# Patient Record
Sex: FEMALE | Race: BLACK OR AFRICAN AMERICAN | NOT HISPANIC OR LATINO | Employment: FULL TIME | URBAN - METROPOLITAN AREA
[De-identification: names, ages, dates, MRNs, and addresses within clinical notes are randomized per-mention and may not be internally consistent; named-entity substitution may affect disease eponyms.]

---

## 2019-06-04 ENCOUNTER — APPOINTMENT (EMERGENCY)
Dept: CT IMAGING | Facility: HOSPITAL | Age: 34
End: 2019-06-04

## 2019-06-04 ENCOUNTER — HOSPITAL ENCOUNTER (EMERGENCY)
Facility: HOSPITAL | Age: 34
Discharge: HOME | End: 2019-06-04
Attending: EMERGENCY MEDICINE | Admitting: EMERGENCY MEDICINE

## 2019-06-04 ENCOUNTER — APPOINTMENT (EMERGENCY)
Dept: RADIOLOGY | Facility: HOSPITAL | Age: 34
End: 2019-06-04

## 2019-06-04 VITALS
OXYGEN SATURATION: 100 % | HEART RATE: 87 BPM | DIASTOLIC BLOOD PRESSURE: 61 MMHG | SYSTOLIC BLOOD PRESSURE: 108 MMHG | RESPIRATION RATE: 18 BRPM | TEMPERATURE: 98 F | WEIGHT: 171.96 LBS

## 2019-06-04 DIAGNOSIS — M54.50 LOW BACK PAIN: ICD-10-CM

## 2019-06-04 DIAGNOSIS — R07.9 NONSPECIFIC CHEST PAIN: Primary | ICD-10-CM

## 2019-06-04 LAB
ALBUMIN SERPL BCP-MCNC: 3.6 G/DL (ref 3.5–5)
ALP SERPL-CCNC: 58 U/L (ref 46–116)
ALT SERPL W P-5'-P-CCNC: 17 U/L (ref 12–78)
ANION GAP SERPL CALCULATED.3IONS-SCNC: 11 MMOL/L (ref 4–13)
APTT PPP: 34 SECONDS (ref 26–38)
AST SERPL W P-5'-P-CCNC: 26 U/L (ref 5–45)
BACTERIA UR QL AUTO: ABNORMAL /HPF
BASOPHILS # BLD AUTO: 0.04 THOUSANDS/ΜL (ref 0–0.1)
BASOPHILS NFR BLD AUTO: 1 % (ref 0–1)
BILIRUB SERPL-MCNC: 0.4 MG/DL (ref 0.2–1)
BILIRUB UR QL STRIP: NEGATIVE
BUN SERPL-MCNC: 9 MG/DL (ref 5–25)
CALCIUM SERPL-MCNC: 8.8 MG/DL (ref 8.3–10.1)
CHLORIDE SERPL-SCNC: 107 MMOL/L (ref 100–108)
CLARITY UR: CLEAR
CO2 SERPL-SCNC: 26 MMOL/L (ref 21–32)
COLOR UR: YELLOW
CREAT SERPL-MCNC: 0.67 MG/DL (ref 0.6–1.3)
EOSINOPHIL # BLD AUTO: 0.07 THOUSAND/ΜL (ref 0–0.61)
EOSINOPHIL NFR BLD AUTO: 1 % (ref 0–6)
ERYTHROCYTE [DISTWIDTH] IN BLOOD BY AUTOMATED COUNT: 17.1 % (ref 11.6–15.1)
EXT PREG TEST URINE: NEGATIVE
GFR SERPL CREATININE-BSD FRML MDRD: 134 ML/MIN/1.73SQ M
GLUCOSE SERPL-MCNC: 87 MG/DL (ref 65–140)
GLUCOSE UR STRIP-MCNC: NEGATIVE MG/DL
HCT VFR BLD AUTO: 34.3 % (ref 34.8–46.1)
HGB BLD-MCNC: 11.1 G/DL (ref 11.5–15.4)
HGB UR QL STRIP.AUTO: ABNORMAL
IMM GRANULOCYTES # BLD AUTO: 0.02 THOUSAND/UL (ref 0–0.2)
IMM GRANULOCYTES NFR BLD AUTO: 0 % (ref 0–2)
INR PPP: 1.04 (ref 0.86–1.17)
KETONES UR STRIP-MCNC: NEGATIVE MG/DL
LEUKOCYTE ESTERASE UR QL STRIP: ABNORMAL
LYMPHOCYTES # BLD AUTO: 2.18 THOUSANDS/ΜL (ref 0.6–4.47)
LYMPHOCYTES NFR BLD AUTO: 34 % (ref 14–44)
MCH RBC QN AUTO: 25.8 PG (ref 26.8–34.3)
MCHC RBC AUTO-ENTMCNC: 32.4 G/DL (ref 31.4–37.4)
MCV RBC AUTO: 80 FL (ref 82–98)
MONOCYTES # BLD AUTO: 0.49 THOUSAND/ΜL (ref 0.17–1.22)
MONOCYTES NFR BLD AUTO: 8 % (ref 4–12)
NEUTROPHILS # BLD AUTO: 3.58 THOUSANDS/ΜL (ref 1.85–7.62)
NEUTS SEG NFR BLD AUTO: 56 % (ref 43–75)
NITRITE UR QL STRIP: NEGATIVE
NON-SQ EPI CELLS URNS QL MICRO: ABNORMAL /HPF
NRBC BLD AUTO-RTO: 0 /100 WBCS
PH UR STRIP.AUTO: 7.5 [PH]
PLATELET # BLD AUTO: 399 THOUSANDS/UL (ref 149–390)
PMV BLD AUTO: 11 FL (ref 8.9–12.7)
POTASSIUM SERPL-SCNC: 4.7 MMOL/L (ref 3.5–5.3)
PROT SERPL-MCNC: 7.3 G/DL (ref 6.4–8.2)
PROT UR STRIP-MCNC: NEGATIVE MG/DL
PROTHROMBIN TIME: 13.5 SECONDS (ref 11.8–14.2)
RBC # BLD AUTO: 4.3 MILLION/UL (ref 3.81–5.12)
RBC #/AREA URNS AUTO: ABNORMAL /HPF
SODIUM SERPL-SCNC: 144 MMOL/L (ref 136–145)
SP GR UR STRIP.AUTO: 1.01 (ref 1–1.03)
TROPONIN I SERPL-MCNC: <0.02 NG/ML
TSH SERPL DL<=0.05 MIU/L-ACNC: 3.89 UIU/ML (ref 0.36–3.74)
UROBILINOGEN UR QL STRIP.AUTO: 0.2 E.U./DL
WBC # BLD AUTO: 6.38 THOUSAND/UL (ref 4.31–10.16)
WBC #/AREA URNS AUTO: ABNORMAL /HPF

## 2019-06-04 PROCEDURE — 81001 URINALYSIS AUTO W/SCOPE: CPT | Performed by: EMERGENCY MEDICINE

## 2019-06-04 PROCEDURE — 84443 ASSAY THYROID STIM HORMONE: CPT | Performed by: EMERGENCY MEDICINE

## 2019-06-04 PROCEDURE — 96375 TX/PRO/DX INJ NEW DRUG ADDON: CPT

## 2019-06-04 PROCEDURE — 80053 COMPREHEN METABOLIC PANEL: CPT | Performed by: EMERGENCY MEDICINE

## 2019-06-04 PROCEDURE — 36415 COLL VENOUS BLD VENIPUNCTURE: CPT | Performed by: EMERGENCY MEDICINE

## 2019-06-04 PROCEDURE — 84484 ASSAY OF TROPONIN QUANT: CPT | Performed by: EMERGENCY MEDICINE

## 2019-06-04 PROCEDURE — 96361 HYDRATE IV INFUSION ADD-ON: CPT

## 2019-06-04 PROCEDURE — 96374 THER/PROPH/DIAG INJ IV PUSH: CPT

## 2019-06-04 PROCEDURE — 81025 URINE PREGNANCY TEST: CPT | Performed by: EMERGENCY MEDICINE

## 2019-06-04 PROCEDURE — 99285 EMERGENCY DEPT VISIT HI MDM: CPT | Performed by: EMERGENCY MEDICINE

## 2019-06-04 PROCEDURE — 99285 EMERGENCY DEPT VISIT HI MDM: CPT

## 2019-06-04 PROCEDURE — 85610 PROTHROMBIN TIME: CPT | Performed by: EMERGENCY MEDICINE

## 2019-06-04 PROCEDURE — 85730 THROMBOPLASTIN TIME PARTIAL: CPT | Performed by: EMERGENCY MEDICINE

## 2019-06-04 PROCEDURE — 74176 CT ABD & PELVIS W/O CONTRAST: CPT

## 2019-06-04 PROCEDURE — 85025 COMPLETE CBC W/AUTO DIFF WBC: CPT | Performed by: EMERGENCY MEDICINE

## 2019-06-04 PROCEDURE — 71046 X-RAY EXAM CHEST 2 VIEWS: CPT

## 2019-06-04 RX ORDER — NAPROXEN 500 MG/1
500 TABLET ORAL 2 TIMES DAILY WITH MEALS
Qty: 20 TABLET | Refills: 0 | Status: SHIPPED | OUTPATIENT
Start: 2019-06-04

## 2019-06-04 RX ORDER — KETOROLAC TROMETHAMINE 30 MG/ML
30 INJECTION, SOLUTION INTRAMUSCULAR; INTRAVENOUS ONCE
Status: COMPLETED | OUTPATIENT
Start: 2019-06-04 | End: 2019-06-04

## 2019-06-04 RX ORDER — CYCLOBENZAPRINE HCL 10 MG
10 TABLET ORAL 2 TIMES DAILY PRN
Qty: 20 TABLET | Refills: 0 | Status: SHIPPED | OUTPATIENT
Start: 2019-06-04

## 2019-06-04 RX ADMIN — SODIUM CHLORIDE 1000 ML: 0.9 INJECTION, SOLUTION INTRAVENOUS at 19:47

## 2019-06-04 RX ADMIN — KETOROLAC TROMETHAMINE 30 MG: 30 INJECTION, SOLUTION INTRAMUSCULAR at 19:12

## 2019-06-04 RX ADMIN — FAMOTIDINE 20 MG: 10 INJECTION, SOLUTION INTRAVENOUS at 19:12

## 2021-01-17 ENCOUNTER — APPOINTMENT (EMERGENCY)
Dept: RADIOLOGY | Facility: HOSPITAL | Age: 36
End: 2021-01-17
Payer: COMMERCIAL

## 2021-01-17 ENCOUNTER — HOSPITAL ENCOUNTER (EMERGENCY)
Facility: HOSPITAL | Age: 36
Discharge: HOME | End: 2021-01-17
Attending: EMERGENCY MEDICINE | Admitting: EMERGENCY MEDICINE
Payer: COMMERCIAL

## 2021-01-17 VITALS
RESPIRATION RATE: 20 BRPM | SYSTOLIC BLOOD PRESSURE: 141 MMHG | BODY MASS INDEX: 34.53 KG/M2 | HEIGHT: 67 IN | OXYGEN SATURATION: 100 % | HEART RATE: 74 BPM | DIASTOLIC BLOOD PRESSURE: 70 MMHG | TEMPERATURE: 98.4 F | WEIGHT: 220.02 LBS

## 2021-01-17 DIAGNOSIS — S82.892A CLOSED FRACTURE DISLOCATION OF LEFT ANKLE, INITIAL ENCOUNTER: ICD-10-CM
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
## 2021-01-17 DIAGNOSIS — S82.852A: Primary | ICD-10-CM

## 2021-01-17 PROCEDURE — 99152 MOD SED SAME PHYS/QHP 5/>YRS: CPT | Performed by: EMERGENCY MEDICINE

## 2021-01-17 PROCEDURE — 73610 X-RAY EXAM OF ANKLE: CPT

## 2021-01-17 PROCEDURE — 27818 TREATMENT OF ANKLE FRACTURE: CPT | Performed by: EMERGENCY MEDICINE

## 2021-01-17 PROCEDURE — 99284 EMERGENCY DEPT VISIT MOD MDM: CPT

## 2021-01-17 PROCEDURE — 99285 EMERGENCY DEPT VISIT HI MDM: CPT | Performed by: EMERGENCY MEDICINE

## 2021-01-17 PROCEDURE — 73600 X-RAY EXAM OF ANKLE: CPT

## 2021-01-17 PROCEDURE — 96374 THER/PROPH/DIAG INJ IV PUSH: CPT

## 2021-01-17 RX ORDER — HYDROCODONE BITARTRATE AND ACETAMINOPHEN 5; 325 MG/1; MG/1
1 TABLET ORAL EVERY 6 HOURS PRN
Qty: 12 TABLET | Refills: 0 | Status: SHIPPED | OUTPATIENT
Start: 2021-01-17 | End: 2021-01-25 | Stop reason: HOSPADM

## 2021-01-17 RX ORDER — PROPOFOL 10 MG/ML
100 INJECTION, EMULSION INTRAVENOUS ONCE
Status: COMPLETED | OUTPATIENT
Start: 2021-01-17 | End: 2021-01-17

## 2021-01-17 RX ADMIN — MORPHINE SULFATE 2 MG: 2 INJECTION, SOLUTION INTRAMUSCULAR; INTRAVENOUS at 17:30

## 2021-01-17 RX ADMIN — PROPOFOL 100 MG: 10 INJECTION, EMULSION INTRAVENOUS at 18:07

## 2021-01-17 NOTE — ED PROVIDER NOTES
History  Chief Complaint   Patient presents with    Ankle Injury     pt was cleaning out her car and fell on ice, no BT, no LOC did not hit her head     HPI    Prior to Admission Medications   Prescriptions Last Dose Informant Patient Reported? Taking? cyclobenzaprine (FLEXERIL) 10 mg tablet   No No   Sig: Take 1 tablet (10 mg total) by mouth 2 (two) times a day as needed for muscle spasms   naproxen (NAPROSYN) 500 mg tablet   No No   Sig: Take 1 tablet (500 mg total) by mouth 2 (two) times a day with meals      Facility-Administered Medications: None       History reviewed  No pertinent past medical history  Past Surgical History:   Procedure Laterality Date     SECTION         History reviewed  No pertinent family history  I have reviewed and agree with the history as documented  E-Cigarette/Vaping    E-Cigarette Use Never User      E-Cigarette/Vaping Substances     Social History     Tobacco Use    Smoking status: Never Smoker    Smokeless tobacco: Never Used   Substance Use Topics    Alcohol use: Not Currently    Drug use: Not on file       Review of Systems    Physical Exam  Physical Exam  Vitals signs and nursing note reviewed  Constitutional:       General: She is not in acute distress  Appearance: She is well-developed  HENT:      Head: Normocephalic and atraumatic  Eyes:      Conjunctiva/sclera: Conjunctivae normal       Pupils: Pupils are equal, round, and reactive to light  Neck:      Musculoskeletal: Normal range of motion  Trachea: No tracheal deviation  Cardiovascular:      Rate and Rhythm: Normal rate and regular rhythm  Pulses:           Dorsalis pedis pulses are 2+ on the left side  Pulmonary:      Effort: Pulmonary effort is normal  No respiratory distress  Musculoskeletal:      Left hip: She exhibits normal range of motion and no tenderness  Left knee: She exhibits no swelling  No tenderness found        Left ankle: She exhibits decreased range of motion, swelling and deformity  Tenderness (diffuse)  Left upper leg: She exhibits no tenderness  Left lower leg: She exhibits no tenderness  Left foot: Normal range of motion  No tenderness  Skin:     General: Skin is warm and dry  Neurological:      Mental Status: She is alert and oriented to person, place, and time  GCS: GCS eye subscore is 4  GCS verbal subscore is 5  GCS motor subscore is 6     Psychiatric:         Behavior: Behavior normal          Vital Signs  ED Triage Vitals   Temperature Pulse Respirations Blood Pressure SpO2   01/17/21 1701 01/17/21 1701 01/17/21 1701 01/17/21 1701 01/17/21 1701   98 4 °F (36 9 °C) 83 18 139/66 99 %      Temp Source Heart Rate Source Patient Position - Orthostatic VS BP Location FiO2 (%)   01/17/21 1701 01/17/21 1701 01/17/21 1701 01/17/21 1701 --   Oral Monitor Sitting Right arm       Pain Score       01/17/21 1730       Worst Possible Pain           Vitals:    01/17/21 1810 01/17/21 1810 01/17/21 1812 01/17/21 1815   BP: 152/68 152/68  133/63   Pulse:  96 80 71   Patient Position - Orthostatic VS:    Lying         Visual Acuity      ED Medications  Medications   morphine injection 2 mg (2 mg Intravenous Given 1/17/21 1730)   propofol (DIPRIVAN) 200 MG/20ML bolus injection 100 mg (100 mg Intravenous Given 1/17/21 1807)       Diagnostic Studies  Results Reviewed     None                 XR ankle 2 views LEFT   ED Interpretation by Ericka López MD (01/17 1838)   Improved alignment      XR ankle 3+ views LEFT   ED Interpretation by Ericka López MD (01/17 1728)   Fracture/dislocation                 Procedures  Pre-Procedural Sedation  Performed by: Ericka López MD  Authorized by: Ericka López MD     Consent:     Consent obtained:  Verbal    Consent given by:  Patient    Risks discussed:  Dysrhythmia, allergic reaction, inadequate sedation, nausea, respiratory compromise necessitating ventilatory assistance and intubation, prolonged sedation necessitating reversal, prolonged hypoxia resulting in organ damage and vomiting    Alternatives discussed:  Analgesia without sedation  Universal protocol:     Procedure explained and questions answered to patient or proxy's satisfaction: yes      Site/side marked: yes    Indications:     Sedation purpose:  Fracture reduction    Procedure necessitating sedation performed by:  Physician performing sedation    Intended level of sedation:  Moderate (conscious sedation)  Pre-sedation assessment:     NPO status caution: urgency dictates proceeding with non-ideal NPO status      ASA classification: class 1 - normal, healthy patient      Mouth opening:  3 or more finger widths    Thyromental distance:  3 finger widths    Mallampati score:  II - soft palate, uvula, fauces visible    Pre-sedation assessments completed and reviewed: airway patency, cardiovascular function, hydration status, mental status, nausea/vomiting, pain level, respiratory function and temperature      History of difficult intubation: no      Pre-sedation assessment completed:  1/17/2021 5:40 PM  Orthopedic injury treatment    Date/Time: 1/17/2021 6:10 PM  Performed by: Chao Zuniga MD  Authorized by: Chao Zuniga MD     Patient Location:  Bedside  Other Assisting Provider: Yes (comment) deidra Brownlee)    Verbal consent obtained?: Yes    Risks and benefits: Risks, benefits and alternatives were discussed    Consent given by:  Parent and patient  Patient states understanding of procedure being performed: Yes    Patient's understanding of procedure matches consent: Yes    Procedure consent matches procedure scheduled: Yes    Relevant documents present and verified: Yes    Radiology Images displayed and confirmed   If images not available, report reviewed: Yes    Required items: Required blood products, implants, devices and special equipment available    Patient identity confirmed:  Verbally with patient  Time out: Immediately prior to the procedure a time out was called    Injury location:  Ankle  Location details:  Left ankle  Injury type:  Fracture-dislocation  Fracture type: trimalleolar    Neurovascular status: Neurovascularly intact    Distal perfusion: normal    Neurological function: normal    Range of motion: reduced    Local anesthesia used?: No    General anesthesia used?: No    Sedation type: Moderate (conscious) sedation (See separate Procedural Sedation form)  Manipulation performed?: Yes    Skeletal traction used?: Yes    Reduction successful?: Yes    Confirmation: Reduction confirmed by x-ray    Immobilization:  Splint  Splint type:  Short leg  Supplies used:  Ortho-Glass  Neurovascular status: Neurovascularly intact    Distal perfusion: normal    Neurological function: normal    Range of motion: improved    Range of motion comment:  Passive ROM performed under sedation  Patient tolerance:  Patient tolerated the procedure well with no immediate complications   Mild anterior displacement of the tibia in relation to the talus but significantly improved alignment  Patient neurovascular intact distally after splint placement    Procedural Sedation    Date/Time: 1/17/2021 6:05 PM  Performed by: Evangelina Manley MD  Authorized by: Evangelina Manley MD     Immediate pre-procedure details:     Reassessment: Patient reassessed immediately prior to procedure      Reviewed: vital signs, relevant labs/tests and NPO status      Verified: bag valve mask available, emergency equipment available, intubation equipment available, IV patency confirmed, oxygen available and suction available    Procedure details (see MAR for exact dosages):     Sedation start time:  1/17/2021 6:05 PM    Preoxygenation:  Nasal cannula    Sedation:  Propofol    Intra-procedure monitoring:  Blood pressure monitoring, cardiac monitor, continuous capnometry, continuous pulse oximetry, frequent LOC assessments and frequent vital sign checks    Intra-procedure events: none      Sedation end time:  1/17/2021 6:20 PM    Total sedation time (minutes):  15  Post-procedure details:     Post-sedation assessment completed:  1/17/2021 6:30 PM    Attendance: Constant attendance by certified staff until patient recovered      Recovery: Patient returned to pre-procedure baseline      Post-sedation assessments completed and reviewed: airway patency, cardiovascular function, hydration status, mental status, nausea/vomiting, pain level, respiratory function and temperature      Patient is stable for discharge or admission: yes      Patient tolerance: Tolerated well, no immediate complications      Conscious Sedation Assessment      Classification Score   ASA Scale Assessment  1-Healthy patient, no disease outside surgical process filed at 01/17/2021 1751             ED Course                             SBIRT 22yo+      Most Recent Value   SBIRT (23 yo +)   In order to provide better care to our patients, we are screening all of our patients for alcohol and drug use  Would it be okay to ask you these screening questions? Yes Filed at: 01/17/2021 1706   Initial Alcohol Screen: US AUDIT-C    1  How often do you have a drink containing alcohol?  0 Filed at: 01/17/2021 1706   2  How many drinks containing alcohol do you have on a typical day you are drinking? 0 Filed at: 01/17/2021 1706   3b  FEMALE Any Age, or MALE 65+: How often do you have 4 or more drinks on one occassion? 0 Filed at: 01/17/2021 1706   Audit-C Score  0 Filed at: 01/17/2021 1706   MELODY: How many times in the past year have you    Used an illegal drug or used a prescription medication for non-medical reasons?   Never Filed at: 01/17/2021 1706                    MDM  Number of Diagnoses or Management Options  Closed fracture dislocation of left ankle, initial encounter: new and requires workup  Closed traumatic displaced trimalleolar fracture of left ankle, initial encounter: new and requires workup  Fall due to slipping on ice or snow, initial encounter: new and requires workup  Diagnosis management comments: This is a 51-year-old female who presents here today for evaluation of a left lower leg injury  She states shortly prior to arrival she slipped on ice in front of her house and fell  She has been unable to bear weight since then  She denies other injuries from the event  She denies prior injuries to her ankle  She has not had anything for pain  Pain is localized to the ankle  She was placed in a splint by EMS prior to arrival     Review of systems otherwise negative unless stated as above    She is well-appearing, in no acute distress  She has obvious deformity of the ankle with diffuse tenderness to the area  There is no tenderness throughout the rest like of the foot  She is neurovascularly intact distally  Exam is otherwise unremarkable  We will get x-rays to determine the extent of the underlying injury, however she will likely need sedation for reduction given the visible deformity  X-ray was reviewed by myself and shows a trimalleolar fracture with dislocation  Reduction was performed as above with significant improvement of alignment improved pain  I discussed with the patient findings, treatment at home, need for close follow-up with Orthopedics, and indications for return, and she expresses understanding with this plan         Amount and/or Complexity of Data Reviewed  Tests in the radiology section of CPT®: ordered and reviewed  Independent visualization of images, tracings, or specimens: yes        Disposition  Final diagnoses:   Closed traumatic displaced trimalleolar fracture of left ankle, initial encounter   Closed fracture dislocation of left ankle, initial encounter   Fall due to slipping on ice or snow, initial encounter     Time reflects when diagnosis was documented in both MDM as applicable and the Disposition within this note     Time User Action Codes Description Comment    1/17/2021  6:49 PM FierroRajNathanmike Add [J59 942G] Closed traumatic displaced trimalleolar fracture of left ankle, initial encounter     1/17/2021  6:49 PM Raphael Nathan Castrejonmike Add [Y90 113I] Closed fracture dislocation of left ankle, initial encounter     1/17/2021  6:49 PM Nathan Lim Maryellensheila Add [W00  9XXA] Fall due to slipping on ice or snow, initial encounter       ED Disposition     ED Disposition Condition Date/Time Comment    Discharge Good Sun Jan 17, 2021  6:43 PM Florence Escalera discharge to home/self care  Follow-up Information     Follow up With Specialties Details Why Contact Info Additional [address] [facility] Schedule an appointment as soon as possible for a visit in 3 days to follow up on your ankle [address] [facility] [address], [address] [place] [address], [address] [facility]          Patient's Medications   Discharge Prescriptions    HYDROCODONE-ACETAMINOPHEN (NORCO) 5-325 MG PER TABLET    Take 1 tablet by mouth every 6 (six) hours as needed (severe pain, not otherwise controlled) for up to 10 daysMax Daily Amount: 4 tablets       Start Date: 1/17/2021 End Date: 1/27/2021       Order Dose: 1 tablet       Quantity: 12 tablet    Refills: 0     No discharge procedures on file      PDMP Review     None          ED Provider  Electronically Signed by           Nakul Jennings MD  01/17/21 7855

## 2021-01-17 NOTE — DISCHARGE INSTRUCTIONS
Keep the splint on and dry  Keep your leg elevated as much as possible  Take ibuprofen (Motrin, Advil) or acetaminophen (Tylenol) as needed for pain, as per the instructions  Take the prescription pain medication as needed for continued severe pain  If you can find the pills you have left over oxycodone that you were prescribed after your  you can take those, and shred the prescription you were given today  If you are unable to find it, fill the new prescription to take as needed  Follow up closely with the orthopedist for further evaluation  Return if you have severe pain that you are unable to control at home, have significant swelling of your leg or foot, lose sensation of your toes, have discoloration of the skin, or for any other concerns

## 2021-01-19 ENCOUNTER — APPOINTMENT (OUTPATIENT)
Dept: LAB | Facility: HOSPITAL | Age: 36
End: 2021-01-19
Attending: ORTHOPAEDIC SURGERY
Payer: COMMERCIAL

## 2021-01-19 ENCOUNTER — APPOINTMENT (OUTPATIENT)
Dept: RADIOLOGY | Facility: CLINIC | Age: 36
End: 2021-01-19
Payer: COMMERCIAL

## 2021-01-19 ENCOUNTER — HOSPITAL ENCOUNTER (OUTPATIENT)
Dept: CT IMAGING | Facility: HOSPITAL | Age: 36
Discharge: HOME | End: 2021-01-19
Attending: ORTHOPAEDIC SURGERY
Payer: COMMERCIAL

## 2021-01-19 ENCOUNTER — OFFICE VISIT (OUTPATIENT)
Dept: OBGYN CLINIC | Facility: CLINIC | Age: 36
End: 2021-01-19
Payer: COMMERCIAL

## 2021-01-19 VITALS
HEIGHT: 67 IN | SYSTOLIC BLOOD PRESSURE: 122 MMHG | HEART RATE: 103 BPM | BODY MASS INDEX: 34.46 KG/M2 | DIASTOLIC BLOOD PRESSURE: 83 MMHG

## 2021-01-19 DIAGNOSIS — S82.852A CLOSED TRIMALLEOLAR FRACTURE OF LEFT ANKLE, INITIAL ENCOUNTER: ICD-10-CM

## 2021-01-19 DIAGNOSIS — S82.852A CLOSED TRIMALLEOLAR FRACTURE OF LEFT ANKLE, INITIAL ENCOUNTER: Primary | ICD-10-CM

## 2021-01-19 LAB
ATRIAL RATE: 86 BPM
BASOPHILS # BLD AUTO: 0.03 THOUSANDS/ΜL (ref 0–0.1)
BASOPHILS NFR BLD AUTO: 0 % (ref 0–1)
EOSINOPHIL # BLD AUTO: 0.04 THOUSAND/ΜL (ref 0–0.61)
EOSINOPHIL NFR BLD AUTO: 1 % (ref 0–6)
ERYTHROCYTE [DISTWIDTH] IN BLOOD BY AUTOMATED COUNT: 12.6 % (ref 11.6–15.1)
HCT VFR BLD AUTO: 41.1 % (ref 34.8–46.1)
HGB BLD-MCNC: 13.4 G/DL (ref 11.5–15.4)
IMM GRANULOCYTES # BLD AUTO: 0.01 THOUSAND/UL (ref 0–0.2)
IMM GRANULOCYTES NFR BLD AUTO: 0 % (ref 0–2)
LYMPHOCYTES # BLD AUTO: 2.3 THOUSANDS/ΜL (ref 0.6–4.47)
LYMPHOCYTES NFR BLD AUTO: 32 % (ref 14–44)
MCH RBC QN AUTO: 27.3 PG (ref 26.8–34.3)
MCHC RBC AUTO-ENTMCNC: 32.6 G/DL (ref 31.4–37.4)
MCV RBC AUTO: 84 FL (ref 82–98)
MONOCYTES # BLD AUTO: 0.52 THOUSAND/ΜL (ref 0.17–1.22)
MONOCYTES NFR BLD AUTO: 7 % (ref 4–12)
NEUTROPHILS # BLD AUTO: 4.35 THOUSANDS/ΜL (ref 1.85–7.62)
NEUTS SEG NFR BLD AUTO: 60 % (ref 43–75)
NRBC BLD AUTO-RTO: 0 /100 WBCS
P AXIS: 59 DEGREES
PLATELET # BLD AUTO: 368 THOUSANDS/UL (ref 149–390)
PMV BLD AUTO: 10.6 FL (ref 8.9–12.7)
PR INTERVAL: 160 MS
QRS AXIS: 30 DEGREES
QRSD INTERVAL: 76 MS
QT INTERVAL: 352 MS
QTC INTERVAL: 421 MS
RBC # BLD AUTO: 4.9 MILLION/UL (ref 3.81–5.12)
T WAVE AXIS: 29 DEGREES
VENTRICULAR RATE: 86 BPM
WBC # BLD AUTO: 7.25 THOUSAND/UL (ref 4.31–10.16)

## 2021-01-19 PROCEDURE — 36415 COLL VENOUS BLD VENIPUNCTURE: CPT

## 2021-01-19 PROCEDURE — 93005 ELECTROCARDIOGRAM TRACING: CPT

## 2021-01-19 PROCEDURE — 99204 OFFICE O/P NEW MOD 45 MIN: CPT | Performed by: ORTHOPAEDIC SURGERY

## 2021-01-19 PROCEDURE — 93010 ELECTROCARDIOGRAM REPORT: CPT | Performed by: INTERNAL MEDICINE

## 2021-01-19 PROCEDURE — 73610 X-RAY EXAM OF ANKLE: CPT

## 2021-01-19 PROCEDURE — 29515 APPLICATION SHORT LEG SPLINT: CPT | Performed by: ORTHOPAEDIC SURGERY

## 2021-01-19 PROCEDURE — 85025 COMPLETE CBC W/AUTO DIFF WBC: CPT

## 2021-01-19 PROCEDURE — 73700 CT LOWER EXTREMITY W/O DYE: CPT

## 2021-01-19 RX ORDER — CHLORHEXIDINE GLUCONATE 0.12 MG/ML
15 RINSE ORAL ONCE
Status: CANCELLED | OUTPATIENT
Start: 2021-01-19 | End: 2021-01-19

## 2021-01-19 RX ORDER — NALOXONE HYDROCHLORIDE 4 MG/.1ML
SPRAY NASAL
Qty: 1 EACH | Refills: 1 | Status: SHIPPED | OUTPATIENT
Start: 2021-01-19

## 2021-01-19 RX ORDER — OXYCODONE HYDROCHLORIDE 5 MG/1
5 TABLET ORAL EVERY 4 HOURS PRN
Qty: 30 TABLET | Refills: 0 | Status: SHIPPED | OUTPATIENT
Start: 2021-01-19

## 2021-01-19 NOTE — PROGRESS NOTES
Orthopaedics Office Visit - New  Patient Visit    ASSESSMENT/PLAN:    Assessment:   Left trimalleolar fracture with dislocation tibiotalar joint successfully reduced at ED  New splint placed today  Extensive discussion concerning risks and benefits of ankle ORIF      Plan:   -left ankle STAT CT was ordered for presurgical planning  - pt is to remain NWB in splint and elevate and ice until surgery that is scheduled for Monday   - ORIF left trimalleolar fracture with possible syndesmotic repair was discussed at length as well as post op expectations of NWB of 6-10 weeks   - splint removed for skin check and replace  - Detailed consent was signed in the office   - Pt was advised to take 81 mg ASA 2x a day for DVT prophylaxis   - Oxycodone was sent to patient's pharmacy on file   -Pt was provided with a note to work from home as well as excused from work for 4 days post op  - Pt advised to call the office if she has any questions or concerns [phone]     To Do Next Visit:  Follow up 2 weeks suture removal and xrays   _____________________________________________________  CHIEF COMPLAINT:  Chief Complaint   Patient presents with    Left Ankle - Pain         SUBJECTIVE:  Tabatha Garvey is a 28 y o  female who presents to the office for evaluation of her left ankle  Patient sustained injury 01/17/2021 where she slipped on ice while cleaning out the car  Patient was seen in the emergency department following the injury where x-rays were performed showing trimalleolar fracture with dislocation of left tibiotalar joint  Reduction was performed at ED and post reduction films were obtained  Pt presents to the office in ED applied splint  Pt states that she does continue to use crutches  Pt denies numbness and tingling  Pt denies pertinent medical history  Pt is a nonsmoker  PAST MEDICAL HISTORY:  History reviewed  No pertinent past medical history      PAST SURGICAL HISTORY:  Past Surgical History:   Procedure Laterality Date     SECTION         FAMILY HISTORY:  History reviewed  No pertinent family history  SOCIAL HISTORY:  Social History     Tobacco Use    Smoking status: Never Smoker    Smokeless tobacco: Never Used   Substance Use Topics    Alcohol use: Not Currently    Drug use: Not on file       MEDICATIONS:    Current Outpatient Medications:     HYDROcodone-acetaminophen (NORCO) 5-325 mg per tablet, Take 1 tablet by mouth every 6 (six) hours as needed (severe pain, not otherwise controlled) for up to 10 daysMax Daily Amount: 4 tablets, Disp: 12 tablet, Rfl: 0    cyclobenzaprine (FLEXERIL) 10 mg tablet, Take 1 tablet (10 mg total) by mouth 2 (two) times a day as needed for muscle spasms (Patient not taking: Reported on 2021), Disp: 20 tablet, Rfl: 0    naproxen (NAPROSYN) 500 mg tablet, Take 1 tablet (500 mg total) by mouth 2 (two) times a day with meals (Patient not taking: Reported on 2021), Disp: 20 tablet, Rfl: 0    ALLERGIES:  No Known Allergies    REVIEW OF SYSTEMS:  MSK: left ankle pain  Neuro: negative   Pertinent items are otherwise noted in HPI  A comprehensive review of systems was otherwise negative  LABS:  HgA1c: No results found for: HGBA1C  BMP:   Lab Results   Component Value Date    CALCIUM 8 8 2019    K 4 7 2019    CO2 26 2019     2019    BUN 9 2019    CREATININE 0 67 2019     CBC: No components found for: CBC    _____________________________________________________  PHYSICAL EXAMINATION:  Vital signs: /83   Pulse 103   Ht 5' 7" (1 702 m)   BMI 34 46 kg/m²   General: No acute distress, awake and alert  Psychiatric: Mood and affect appear appropriate  HEENT: Trachea Midline, No torticollis, no apparent facial trauma  Cardiovascular: No audible murmurs;  Extremities appear perfused  Pulmonary: No audible wheezing or stridor  Skin: No open lesions; see further details (if any) below    MUSCULOSKELETAL EXAMINATION:  Extremities:      Left LE   Diffuse swelling but calf compressible  Wrinkles present at sites of planned incision  No fracture blisters  Warm and well perfused   Motion not assessed   Sensation intact sp/dp/ distribution    _____________________________________________________  STUDIES REVIEWED:  I personally reviewed the images and interpretation is as follows:    X-rays of left ankle performed today show maintained reduction of tibiotalar joint, medial , posterior and lateral malleolus fractures       PROCEDURES PERFORMED:  Splint application    Date/Time: 1/19/2021 4:17 PM  Performed by: Ramiro Antoine MD  Authorized by: Ramiro Antoine MD   Universal Protocol:  Consent: Verbal consent obtained  Risks and benefits: risks, benefits and alternatives were discussed  Consent given by: patient  Patient understanding: patient states understanding of the procedure being performed  Patient consent: the patient's understanding of the procedure matches consent given  Radiology Images displayed and confirmed  If images not available, report reviewed: imaging studies available      Procedure details:     Laterality:  Left    Location:  Ankle    Ankle:  L ankle    Splint type:  Short leg    Supplies:  Cotton padding, Ortho-Glass and elastic bandage  Post-procedure details:     Pain:  Unchanged    Patient tolerance of procedure:   Tolerated well, no immediate complications        Scribe Attestation    I,:  Karen Licona am acting as a scribe while in the presence of the attending physician :       I,:  Ramiro Antoine MD personally performed the services described in this documentation    as scribed in my presence :

## 2021-01-19 NOTE — PATIENT INSTRUCTIONS
-left ankle STAT CT was ordered for presurgical planning  - pt is to remain NWB in splint and elevate and ice until surgery that is scheduled for Monday   - ORIF left trimalleolar fracture with possible syndesmotic repair was discussed at length as well as post op expectations of NWB of 6-10 weeks   - Detailed consent was signed in the office   - Pt was advised to take 81 mg ASA 2x a day for DVT prophylaxis   - Oxycodone was sent to patient's pharmacy on file   -Pt was provided with a note to work from home as well as excused from work for 4 days post op  - Pt advised to call the office if she has any questions or concerns [phone]

## 2021-01-19 NOTE — H&P (VIEW-ONLY)
Orthopaedics Office Visit - New  Patient Visit    ASSESSMENT/PLAN:    Assessment:   Left trimalleolar fracture with dislocation tibiotalar joint successfully reduced at ED  New splint placed today  Extensive discussion concerning risks and benefits of ankle ORIF      Plan:   -left ankle STAT CT was ordered for presurgical planning  - pt is to remain NWB in splint and elevate and ice until surgery that is scheduled for Monday   - ORIF left trimalleolar fracture with possible syndesmotic repair was discussed at length as well as post op expectations of NWB of 6-10 weeks   - splint removed for skin check and replace  - Detailed consent was signed in the office   - Pt was advised to take 81 mg ASA 2x a day for DVT prophylaxis   - Oxycodone was sent to patient's pharmacy on file   -Pt was provided with a note to work from home as well as excused from work for 4 days post op  - Pt advised to call the office if she has any questions or concerns [phone]     To Do Next Visit:  Follow up 2 weeks suture removal and xrays   _____________________________________________________  CHIEF COMPLAINT:  Chief Complaint   Patient presents with    Left Ankle - Pain         SUBJECTIVE:  Cheryle Early is a 28 y o  female who presents to the office for evaluation of her left ankle  Patient sustained injury 01/17/2021 where she slipped on ice while cleaning out the car  Patient was seen in the emergency department following the injury where x-rays were performed showing trimalleolar fracture with dislocation of left tibiotalar joint  Reduction was performed at ED and post reduction films were obtained  Pt presents to the office in ED applied splint  Pt states that she does continue to use crutches  Pt denies numbness and tingling  Pt denies pertinent medical history  Pt is a nonsmoker  PAST MEDICAL HISTORY:  History reviewed  No pertinent past medical history      PAST SURGICAL HISTORY:  Past Surgical History:   Procedure Laterality Date     SECTION         FAMILY HISTORY:  History reviewed  No pertinent family history  SOCIAL HISTORY:  Social History     Tobacco Use    Smoking status: Never Smoker    Smokeless tobacco: Never Used   Substance Use Topics    Alcohol use: Not Currently    Drug use: Not on file       MEDICATIONS:    Current Outpatient Medications:     HYDROcodone-acetaminophen (NORCO) 5-325 mg per tablet, Take 1 tablet by mouth every 6 (six) hours as needed (severe pain, not otherwise controlled) for up to 10 daysMax Daily Amount: 4 tablets, Disp: 12 tablet, Rfl: 0    cyclobenzaprine (FLEXERIL) 10 mg tablet, Take 1 tablet (10 mg total) by mouth 2 (two) times a day as needed for muscle spasms (Patient not taking: Reported on 2021), Disp: 20 tablet, Rfl: 0    naproxen (NAPROSYN) 500 mg tablet, Take 1 tablet (500 mg total) by mouth 2 (two) times a day with meals (Patient not taking: Reported on 2021), Disp: 20 tablet, Rfl: 0    ALLERGIES:  No Known Allergies    REVIEW OF SYSTEMS:  MSK: left ankle pain  Neuro: negative   Pertinent items are otherwise noted in HPI  A comprehensive review of systems was otherwise negative  LABS:  HgA1c: No results found for: HGBA1C  BMP:   Lab Results   Component Value Date    CALCIUM 8 8 2019    K 4 7 2019    CO2 26 2019     2019    BUN 9 2019    CREATININE 0 67 2019     CBC: No components found for: CBC    _____________________________________________________  PHYSICAL EXAMINATION:  Vital signs: /83   Pulse 103   Ht 5' 7" (1 702 m)   BMI 34 46 kg/m²   General: No acute distress, awake and alert  Psychiatric: Mood and affect appear appropriate  HEENT: Trachea Midline, No torticollis, no apparent facial trauma  Cardiovascular: No audible murmurs;  Extremities appear perfused  Pulmonary: No audible wheezing or stridor  Skin: No open lesions; see further details (if any) below    MUSCULOSKELETAL EXAMINATION:  Extremities:      Left LE   Diffuse swelling but calf compressible  Wrinkles present at sites of planned incision  No fracture blisters  Warm and well perfused   Motion not assessed   Sensation intact sp/dp/ distribution    _____________________________________________________  STUDIES REVIEWED:  I personally reviewed the images and interpretation is as follows:    X-rays of left ankle performed today show maintained reduction of tibiotalar joint, medial , posterior and lateral malleolus fractures       PROCEDURES PERFORMED:  Splint application    Date/Time: 1/19/2021 4:17 PM  Performed by: Ramiro Antoine MD  Authorized by: Ramiro Antoine MD   Universal Protocol:  Consent: Verbal consent obtained  Risks and benefits: risks, benefits and alternatives were discussed  Consent given by: patient  Patient understanding: patient states understanding of the procedure being performed  Patient consent: the patient's understanding of the procedure matches consent given  Radiology Images displayed and confirmed  If images not available, report reviewed: imaging studies available      Procedure details:     Laterality:  Left    Location:  Ankle    Ankle:  L ankle    Splint type:  Short leg    Supplies:  Cotton padding, Ortho-Glass and elastic bandage  Post-procedure details:     Pain:  Unchanged    Patient tolerance of procedure:   Tolerated well, no immediate complications        Scribe Attestation    I,:  Karen Licona am acting as a scribe while in the presence of the attending physician :       I,:  Ramiro Antoine MD personally performed the services described in this documentation    as scribed in my presence :

## 2021-01-19 NOTE — LETTER
January 19, 2021     Patient: Ana Euceda   YOB: 1985   Date of Visit: 1/19/2021       To Whom it May Concern:    Ana Euceda is under my professional care  She was seen in my office on 1/19/2021  She must continue to work from home until 1/24/2021 and then should be excused from work from 1/25/2021 until 1/28/2021 pt will anticipate working from home due to NEB status for 6-10 weeks after 1/25/2021  If you have any questions or concerns, please don't hesitate to call           Sincerely,          Carmine Espinoza MD        CC: No Recipients

## 2021-01-20 RX ORDER — ASPIRIN 81 MG/1
81 TABLET ORAL DAILY
COMMUNITY

## 2021-01-20 NOTE — PRE-PROCEDURE INSTRUCTIONS
Pre-Surgery Instructions:   Medication Instructions    aspirin (ECOTRIN LOW STRENGTH) 81 mg EC tablet Continue BID until surgery per Dr Norma Grijalva cyclobenzaprine (FLEXERIL) 10 mg tablet Use as needed    HYDROcodone-acetaminophen (NORCO) 5-325 mg per tablet Take as needed    naloxone (NARCAN) 4 mg/0 1 mL nasal spray Use as needed    oxyCODONE (ROXICODONE) 5 mg immediate release tablet Take as needed      My Surgical Experience    The following information was developed to assist you to prepare for your operation  What do I need to do before coming to the hospital?   Arrange for a responsible person to drive you to and from the hospital    Arrange care for your children at home  Children are not allowed in the recovery areas of the hospital   Plan to wear clothing that is easy to put on and take off  If you are having shoulder surgery, wear a shirt that buttons or zippers in the front  Bathing  o Shower the evening before and the morning of your surgery with an antibacterial soap  Please refer to the Pre Op Showering Instructions for Surgery Patients Sheet   o Remove nail polish and all body piercing jewelry  o Do not shave any body part for at least 24 hours before surgery-this includes face, arms, legs and upper body  Food  o Nothing to eat or drink after midnight the night before your surgery  This includes candy and chewing gum  o Exception: If your surgery is after 12:00pm (noon), you may have clear liquids such as 7-Up®, ginger ale, apple or cranberry juice, Jell-O®, water, or clear broth until 8:00 am  o Do not drink milk or juice with pulp on the morning before surgery  o Do not drink alcohol 24 hours before surgery  Medicine  o Follow instructions you received from your surgeon about which medicines you may take on the day of surgery  o If instructed to take medicine on the morning of surgery, take pills with just a small sip of water   Call your prescribing doctor for specific infroamtion on what to do if you take insulin    What should I bring to the hospital?    Bring:  Noreene Height or a walker, if you have them, for foot or knee surgery   A list of the daily medicines, vitamins, minerals, herbals and nutritional supplements you take  Include the dosages of medicines and the time you take them each day   Glasses, dentures or hearing aids   Minimal clothing; you will be wearing hospital sleepwear   Photo ID; required to verify your identity   If you have a Living Will or Power of , bring a copy of the documents   If you have an ostomy, bring an extra pouch and any supplies you use    Do not bring   Medicines or inhalers   Money, valuables or jewelry    What other information should I know about the day of surgery?  Notify your surgeons if you develop a cold, sore throat, cough, fever, rash or any other illness   Report to the Ambulatory Surgical/Same Day Surgery Unit   You will be instructed to stop at Registration only if you have not been pre-registered   Inform your  fi they do not stay that they will be asked by the staff to leave a phone number where they can be reached   Be available to be reached before surgery  In the event the operating room schedule changes, you may be asked to come in earlier or later than expected    *It is important to tell your doctor and others involved in your health care if you are taking or have been taking any non-prescription drugs, vitamins, minerals, herbals or other nutritional supplements   Any of these may interact with some food or medicines and cause a reaction

## 2021-01-24 ENCOUNTER — ANESTHESIA EVENT (OUTPATIENT)
Dept: PERIOP | Facility: HOSPITAL | Age: 36
End: 2021-01-24
Payer: COMMERCIAL

## 2021-01-24 PROBLEM — E66.811 CLASS 1 OBESITY IN ADULT: Status: ACTIVE | Noted: 2021-01-24

## 2021-01-24 PROBLEM — E66.9 CLASS 1 OBESITY IN ADULT: Status: ACTIVE | Noted: 2021-01-24

## 2021-01-25 ENCOUNTER — HOSPITAL ENCOUNTER (OUTPATIENT)
Facility: HOSPITAL | Age: 36
Setting detail: OUTPATIENT SURGERY
Discharge: HOME | End: 2021-01-25
Attending: ORTHOPAEDIC SURGERY | Admitting: ORTHOPAEDIC SURGERY
Payer: COMMERCIAL

## 2021-01-25 ENCOUNTER — ANESTHESIA (OUTPATIENT)
Dept: PERIOP | Facility: HOSPITAL | Age: 36
End: 2021-01-25
Payer: COMMERCIAL

## 2021-01-25 ENCOUNTER — APPOINTMENT (OUTPATIENT)
Dept: RADIOLOGY | Facility: HOSPITAL | Age: 36
End: 2021-01-25
Payer: COMMERCIAL

## 2021-01-25 VITALS
HEART RATE: 70 BPM | SYSTOLIC BLOOD PRESSURE: 125 MMHG | OXYGEN SATURATION: 100 % | TEMPERATURE: 98.1 F | DIASTOLIC BLOOD PRESSURE: 71 MMHG | WEIGHT: 214.07 LBS | HEIGHT: 67 IN | RESPIRATION RATE: 18 BRPM | BODY MASS INDEX: 33.6 KG/M2

## 2021-01-25 VITALS — HEART RATE: 96 BPM

## 2021-01-25 DIAGNOSIS — Z01.818 PRE-OP TESTING: ICD-10-CM

## 2021-01-25 DIAGNOSIS — S82.852A CLOSED TRIMALLEOLAR FRACTURE OF LEFT ANKLE, INITIAL ENCOUNTER: Primary | ICD-10-CM

## 2021-01-25 LAB
EXT PREGNANCY TEST URINE: NEGATIVE
EXT. CONTROL: NORMAL

## 2021-01-25 PROCEDURE — C1713 ANCHOR/SCREW BN/BN,TIS/BN: HCPCS | Performed by: ORTHOPAEDIC SURGERY

## 2021-01-25 PROCEDURE — C1769 GUIDE WIRE: HCPCS | Performed by: ORTHOPAEDIC SURGERY

## 2021-01-25 PROCEDURE — 27823 TREATMENT OF ANKLE FRACTURE: CPT | Performed by: PHYSICIAN ASSISTANT

## 2021-01-25 PROCEDURE — 27823 TREATMENT OF ANKLE FRACTURE: CPT | Performed by: ORTHOPAEDIC SURGERY

## 2021-01-25 PROCEDURE — 77071 MNL APPL STRS JT RADIOGRAPHY: CPT | Performed by: ORTHOPAEDIC SURGERY

## 2021-01-25 PROCEDURE — 81025 URINE PREGNANCY TEST: CPT | Performed by: ORTHOPAEDIC SURGERY

## 2021-01-25 PROCEDURE — 73610 X-RAY EXAM OF ANKLE: CPT | Performed by: ORTHOPAEDIC SURGERY

## 2021-01-25 PROCEDURE — 73610 X-RAY EXAM OF ANKLE: CPT

## 2021-01-25 DEVICE — 2.7MM CORTEX SCREW SLF-TPNG WITH T8 STARDRIVE RECESS/44MM: Type: IMPLANTABLE DEVICE | Site: ANKLE | Status: FUNCTIONAL

## 2021-01-25 DEVICE — 2.7MM CORTEX SCREW SELF-TAPPING 26MM: Type: IMPLANTABLE DEVICE | Site: ANKLE | Status: FUNCTIONAL

## 2021-01-25 DEVICE — 2.4MM CORTEX SCREW SLF-TPNG WITH T8 STARDRIVE RECESS 30MM: Type: IMPLANTABLE DEVICE | Site: ANKLE | Status: FUNCTIONAL

## 2021-01-25 DEVICE — 3.5MM LOCKING SCREW SLF-TPNG W/STARDRIVE(TM) RECESS 18MM: Type: IMPLANTABLE DEVICE | Site: ANKLE | Status: FUNCTIONAL

## 2021-01-25 DEVICE — 4.0MM CANNULATED SCREW-LONG THREAD 58MM: Type: IMPLANTABLE DEVICE | Site: ANKLE | Status: FUNCTIONAL

## 2021-01-25 DEVICE — 3.5MM CORTEX SCREW SELF-TAPPING 14MM: Type: IMPLANTABLE DEVICE | Site: ANKLE | Status: FUNCTIONAL

## 2021-01-25 DEVICE — 2.7MM CORTEX SCREW SLF-TPNG WITH T8 STARDRIVE RECESS/42MM: Type: IMPLANTABLE DEVICE | Site: ANKLE | Status: FUNCTIONAL

## 2021-01-25 DEVICE — LCP ONE-THIRD TUBULAR PLATE WITH COLLAR 7 HOLES/81MM
Type: IMPLANTABLE DEVICE | Site: ANKLE | Status: FUNCTIONAL
Brand: LCP

## 2021-01-25 DEVICE — 2.4MM LCP(TM) T-PLATE 3 HOLES HEAD/7 HOLES SHAFT
Type: IMPLANTABLE DEVICE | Site: ANKLE | Status: FUNCTIONAL
Brand: LCP

## 2021-01-25 DEVICE — 4.0MM CANNULATED SCREW-LONG THREAD 56MM: Type: IMPLANTABLE DEVICE | Site: ANKLE | Status: FUNCTIONAL

## 2021-01-25 DEVICE — 2.4MM CORTEX SCREW SLF-TPNG WITH T8 STARDRIVE RECESS-32MM: Type: IMPLANTABLE DEVICE | Site: ANKLE | Status: FUNCTIONAL

## 2021-01-25 RX ORDER — HYDROMORPHONE HCL/PF 1 MG/ML
0.5 SYRINGE (ML) INJECTION
Status: DISCONTINUED | OUTPATIENT
Start: 2021-01-25 | End: 2021-01-25 | Stop reason: HOSPADM

## 2021-01-25 RX ORDER — ONDANSETRON 2 MG/ML
4 INJECTION INTRAMUSCULAR; INTRAVENOUS EVERY 6 HOURS PRN
Status: DISCONTINUED | OUTPATIENT
Start: 2021-01-25 | End: 2021-01-25 | Stop reason: HOSPADM

## 2021-01-25 RX ORDER — LABETALOL 20 MG/4 ML (5 MG/ML) INTRAVENOUS SYRINGE
AS NEEDED
Status: DISCONTINUED | OUTPATIENT
Start: 2021-01-25 | End: 2021-01-25

## 2021-01-25 RX ORDER — SODIUM CHLORIDE, SODIUM LACTATE, POTASSIUM CHLORIDE, CALCIUM CHLORIDE 600; 310; 30; 20 MG/100ML; MG/100ML; MG/100ML; MG/100ML
125 INJECTION, SOLUTION INTRAVENOUS CONTINUOUS
Status: DISCONTINUED | OUTPATIENT
Start: 2021-01-25 | End: 2021-01-25 | Stop reason: HOSPADM

## 2021-01-25 RX ORDER — ROPIVACAINE HYDROCHLORIDE 5 MG/ML
INJECTION, SOLUTION EPIDURAL; INFILTRATION; PERINEURAL
Status: COMPLETED | OUTPATIENT
Start: 2021-01-25 | End: 2021-01-25

## 2021-01-25 RX ORDER — PROPOFOL 10 MG/ML
INJECTION, EMULSION INTRAVENOUS AS NEEDED
Status: DISCONTINUED | OUTPATIENT
Start: 2021-01-25 | End: 2021-01-25

## 2021-01-25 RX ORDER — FENTANYL CITRATE/PF 50 MCG/ML
25 SYRINGE (ML) INJECTION
Status: COMPLETED | OUTPATIENT
Start: 2021-01-25 | End: 2021-01-25

## 2021-01-25 RX ORDER — OXYCODONE HYDROCHLORIDE 5 MG/1
5 TABLET ORAL EVERY 6 HOURS PRN
Qty: 30 TABLET | Refills: 0 | Status: SHIPPED | OUTPATIENT
Start: 2021-01-25

## 2021-01-25 RX ORDER — GLYCOPYRROLATE 0.2 MG/ML
INJECTION INTRAMUSCULAR; INTRAVENOUS AS NEEDED
Status: DISCONTINUED | OUTPATIENT
Start: 2021-01-25 | End: 2021-01-25

## 2021-01-25 RX ORDER — CHLORHEXIDINE GLUCONATE 0.12 MG/ML
15 RINSE ORAL ONCE
Status: COMPLETED | OUTPATIENT
Start: 2021-01-25 | End: 2021-01-25

## 2021-01-25 RX ORDER — KETAMINE HCL IN NACL, ISO-OSM 100MG/10ML
SYRINGE (ML) INJECTION AS NEEDED
Status: DISCONTINUED | OUTPATIENT
Start: 2021-01-25 | End: 2021-01-25

## 2021-01-25 RX ORDER — HYDROMORPHONE HCL/PF 1 MG/ML
0.2 SYRINGE (ML) INJECTION
Status: COMPLETED | OUTPATIENT
Start: 2021-01-25 | End: 2021-01-25

## 2021-01-25 RX ORDER — ONDANSETRON 2 MG/ML
INJECTION INTRAMUSCULAR; INTRAVENOUS AS NEEDED
Status: DISCONTINUED | OUTPATIENT
Start: 2021-01-25 | End: 2021-01-25

## 2021-01-25 RX ORDER — DIPHENHYDRAMINE HYDROCHLORIDE 50 MG/ML
12.5 INJECTION INTRAMUSCULAR; INTRAVENOUS ONCE AS NEEDED
Status: DISCONTINUED | OUTPATIENT
Start: 2021-01-25 | End: 2021-01-25 | Stop reason: HOSPADM

## 2021-01-25 RX ORDER — DEXMEDETOMIDINE HYDROCHLORIDE 100 UG/ML
INJECTION, SOLUTION INTRAVENOUS AS NEEDED
Status: DISCONTINUED | OUTPATIENT
Start: 2021-01-25 | End: 2021-01-25

## 2021-01-25 RX ORDER — TRAMADOL HYDROCHLORIDE 50 MG/1
50 TABLET ORAL EVERY 6 HOURS SCHEDULED
Status: DISCONTINUED | OUTPATIENT
Start: 2021-01-25 | End: 2021-01-25 | Stop reason: HOSPADM

## 2021-01-25 RX ORDER — DEXAMETHASONE SODIUM PHOSPHATE 10 MG/ML
INJECTION, SOLUTION INTRAMUSCULAR; INTRAVENOUS AS NEEDED
Status: DISCONTINUED | OUTPATIENT
Start: 2021-01-25 | End: 2021-01-25

## 2021-01-25 RX ORDER — FENTANYL CITRATE 50 UG/ML
INJECTION, SOLUTION INTRAMUSCULAR; INTRAVENOUS
Status: COMPLETED | OUTPATIENT
Start: 2021-01-25 | End: 2021-01-25

## 2021-01-25 RX ORDER — ONDANSETRON 2 MG/ML
4 INJECTION INTRAMUSCULAR; INTRAVENOUS ONCE AS NEEDED
Status: DISCONTINUED | OUTPATIENT
Start: 2021-01-25 | End: 2021-01-25 | Stop reason: HOSPADM

## 2021-01-25 RX ORDER — CEFAZOLIN SODIUM 2 G/50ML
2000 SOLUTION INTRAVENOUS ONCE
Status: COMPLETED | OUTPATIENT
Start: 2021-01-25 | End: 2021-01-25

## 2021-01-25 RX ORDER — LIDOCAINE HYDROCHLORIDE 20 MG/ML
INJECTION, SOLUTION EPIDURAL; INFILTRATION; INTRACAUDAL; PERINEURAL AS NEEDED
Status: DISCONTINUED | OUTPATIENT
Start: 2021-01-25 | End: 2021-01-25

## 2021-01-25 RX ORDER — ROCURONIUM BROMIDE 10 MG/ML
INJECTION, SOLUTION INTRAVENOUS AS NEEDED
Status: DISCONTINUED | OUTPATIENT
Start: 2021-01-25 | End: 2021-01-25

## 2021-01-25 RX ORDER — MIDAZOLAM HYDROCHLORIDE 2 MG/2ML
INJECTION, SOLUTION INTRAMUSCULAR; INTRAVENOUS
Status: COMPLETED | OUTPATIENT
Start: 2021-01-25 | End: 2021-01-25

## 2021-01-25 RX ORDER — NEOSTIGMINE METHYLSULFATE 1 MG/ML
INJECTION INTRAVENOUS AS NEEDED
Status: DISCONTINUED | OUTPATIENT
Start: 2021-01-25 | End: 2021-01-25

## 2021-01-25 RX ORDER — ROPIVACAINE HYDROCHLORIDE 2 MG/ML
INJECTION, SOLUTION EPIDURAL; INFILTRATION; PERINEURAL
Status: COMPLETED | OUTPATIENT
Start: 2021-01-25 | End: 2021-01-25

## 2021-01-25 RX ORDER — ACETAMINOPHEN 325 MG/1
650 TABLET ORAL EVERY 6 HOURS PRN
Status: DISCONTINUED | OUTPATIENT
Start: 2021-01-25 | End: 2021-01-25 | Stop reason: HOSPADM

## 2021-01-25 RX ORDER — MAGNESIUM HYDROXIDE 1200 MG/15ML
LIQUID ORAL AS NEEDED
Status: DISCONTINUED | OUTPATIENT
Start: 2021-01-25 | End: 2021-01-25 | Stop reason: HOSPADM

## 2021-01-25 RX ADMIN — LABETALOL 20 MG/4 ML (5 MG/ML) INTRAVENOUS SYRINGE 5 MG: at 09:37

## 2021-01-25 RX ADMIN — ROPIVACAINE HYDROCHLORIDE 20 ML: 2 INJECTION, SOLUTION EPIDURAL; INFILTRATION at 07:20

## 2021-01-25 RX ADMIN — HYDROMORPHONE HYDROCHLORIDE 0.2 MG: 1 INJECTION, SOLUTION INTRAMUSCULAR; INTRAVENOUS; SUBCUTANEOUS at 13:12

## 2021-01-25 RX ADMIN — TRAMADOL HYDROCHLORIDE 50 MG: 50 TABLET, FILM COATED ORAL at 14:28

## 2021-01-25 RX ADMIN — Medication 20 MG: at 09:54

## 2021-01-25 RX ADMIN — FENTANYL CITRATE 50 MCG: 50 INJECTION, SOLUTION INTRAMUSCULAR; INTRAVENOUS at 08:31

## 2021-01-25 RX ADMIN — CEFAZOLIN SODIUM 2000 MG: 2 SOLUTION INTRAVENOUS at 07:45

## 2021-01-25 RX ADMIN — ACETAMINOPHEN 650 MG: 325 TABLET, FILM COATED ORAL at 14:28

## 2021-01-25 RX ADMIN — HYDROMORPHONE HYDROCHLORIDE 0.2 MG: 1 INJECTION, SOLUTION INTRAMUSCULAR; INTRAVENOUS; SUBCUTANEOUS at 13:29

## 2021-01-25 RX ADMIN — DEXMEDETOMIDINE HCL 8 MCG: 100 INJECTION INTRAVENOUS at 08:17

## 2021-01-25 RX ADMIN — MIDAZOLAM HYDROCHLORIDE 2 MG: 1 INJECTION, SOLUTION INTRAMUSCULAR; INTRAVENOUS at 07:20

## 2021-01-25 RX ADMIN — HYDROMORPHONE HYDROCHLORIDE 0.2 MG: 1 INJECTION, SOLUTION INTRAMUSCULAR; INTRAVENOUS; SUBCUTANEOUS at 13:24

## 2021-01-25 RX ADMIN — FENTANYL CITRATE 25 MCG: 50 INJECTION, SOLUTION INTRAMUSCULAR; INTRAVENOUS at 12:13

## 2021-01-25 RX ADMIN — PROPOFOL 200 MG: 10 INJECTION, EMULSION INTRAVENOUS at 07:38

## 2021-01-25 RX ADMIN — DEXAMETHASONE SODIUM PHOSPHATE 4 MG: 10 INJECTION, SOLUTION INTRAMUSCULAR; INTRAVENOUS at 07:41

## 2021-01-25 RX ADMIN — FENTANYL CITRATE 50 MCG: 50 INJECTION, SOLUTION INTRAMUSCULAR; INTRAVENOUS at 08:08

## 2021-01-25 RX ADMIN — Medication 15 MG: at 09:42

## 2021-01-25 RX ADMIN — FENTANYL CITRATE 25 MCG: 50 INJECTION, SOLUTION INTRAMUSCULAR; INTRAVENOUS at 12:06

## 2021-01-25 RX ADMIN — SODIUM CHLORIDE, SODIUM LACTATE, POTASSIUM CHLORIDE, AND CALCIUM CHLORIDE: .6; .31; .03; .02 INJECTION, SOLUTION INTRAVENOUS at 09:13

## 2021-01-25 RX ADMIN — LABETALOL 20 MG/4 ML (5 MG/ML) INTRAVENOUS SYRINGE 10 MG: at 10:02

## 2021-01-25 RX ADMIN — HYDROMORPHONE HYDROCHLORIDE 0.5 MG: 1 INJECTION, SOLUTION INTRAMUSCULAR; INTRAVENOUS; SUBCUTANEOUS at 13:52

## 2021-01-25 RX ADMIN — FENTANYL CITRATE 25 MCG: 50 INJECTION, SOLUTION INTRAMUSCULAR; INTRAVENOUS at 12:27

## 2021-01-25 RX ADMIN — Medication 15 MG: at 09:01

## 2021-01-25 RX ADMIN — ONDANSETRON 4 MG: 2 INJECTION INTRAMUSCULAR; INTRAVENOUS at 11:11

## 2021-01-25 RX ADMIN — GLYCOPYRROLATE 0.2 MG: 0.2 INJECTION, SOLUTION INTRAMUSCULAR; INTRAVENOUS at 11:14

## 2021-01-25 RX ADMIN — LABETALOL 20 MG/4 ML (5 MG/ML) INTRAVENOUS SYRINGE 5 MG: at 09:20

## 2021-01-25 RX ADMIN — LABETALOL 20 MG/4 ML (5 MG/ML) INTRAVENOUS SYRINGE 5 MG: at 07:51

## 2021-01-25 RX ADMIN — FENTANYL CITRATE 100 MCG: 50 INJECTION, SOLUTION INTRAMUSCULAR; INTRAVENOUS at 07:20

## 2021-01-25 RX ADMIN — SODIUM CHLORIDE, SODIUM LACTATE, POTASSIUM CHLORIDE, AND CALCIUM CHLORIDE 125 ML/HR: .6; .31; .03; .02 INJECTION, SOLUTION INTRAVENOUS at 07:19

## 2021-01-25 RX ADMIN — PHENYLEPHRINE HYDROCHLORIDE 100 MCG: 10 INJECTION INTRAVENOUS at 08:19

## 2021-01-25 RX ADMIN — DEXMEDETOMIDINE HCL 12 MCG: 100 INJECTION INTRAVENOUS at 07:45

## 2021-01-25 RX ADMIN — CHLORHEXIDINE GLUCONATE 0.12% ORAL RINSE 15 ML: 1.2 LIQUID ORAL at 07:05

## 2021-01-25 RX ADMIN — FENTANYL CITRATE 25 MCG: 50 INJECTION, SOLUTION INTRAMUSCULAR; INTRAVENOUS at 12:00

## 2021-01-25 RX ADMIN — GLYCOPYRROLATE 0.4 MG: 0.2 INJECTION, SOLUTION INTRAMUSCULAR; INTRAVENOUS at 11:11

## 2021-01-25 RX ADMIN — NEOSTIGMINE METHYLSULFATE 2 MG: 1 INJECTION INTRAVENOUS at 11:11

## 2021-01-25 RX ADMIN — ROCURONIUM BROMIDE 50 MG: 10 SOLUTION INTRAVENOUS at 07:39

## 2021-01-25 RX ADMIN — DEXMEDETOMIDINE HCL 8 MCG: 100 INJECTION INTRAVENOUS at 07:58

## 2021-01-25 RX ADMIN — LABETALOL 20 MG/4 ML (5 MG/ML) INTRAVENOUS SYRINGE 5 MG: at 09:55

## 2021-01-25 RX ADMIN — FENTANYL CITRATE 25 MCG: 50 INJECTION, SOLUTION INTRAMUSCULAR; INTRAVENOUS at 10:57

## 2021-01-25 RX ADMIN — NEOSTIGMINE METHYLSULFATE 1 MG: 1 INJECTION INTRAVENOUS at 11:14

## 2021-01-25 RX ADMIN — HYDROMORPHONE HYDROCHLORIDE 0.2 MG: 1 INJECTION, SOLUTION INTRAMUSCULAR; INTRAVENOUS; SUBCUTANEOUS at 12:55

## 2021-01-25 RX ADMIN — LIDOCAINE HYDROCHLORIDE 100 MG: 20 INJECTION, SOLUTION EPIDURAL; INFILTRATION; INTRACAUDAL; PERINEURAL at 07:38

## 2021-01-25 RX ADMIN — ROPIVACAINE HYDROCHLORIDE 30 ML: 5 INJECTION, SOLUTION EPIDURAL; INFILTRATION; PERINEURAL at 07:20

## 2021-01-25 RX ADMIN — DEXMEDETOMIDINE HCL 8 MCG: 100 INJECTION INTRAVENOUS at 08:39

## 2021-01-25 RX ADMIN — HYDROMORPHONE HYDROCHLORIDE 0.2 MG: 1 INJECTION, SOLUTION INTRAMUSCULAR; INTRAVENOUS; SUBCUTANEOUS at 13:19

## 2021-01-25 NOTE — INTERVAL H&P NOTE
H&P reviewed  After examining the patient I find no changes in the patients condition since the H&P had been written      Vitals:    01/25/21 0700   BP: 134/75   Pulse: 102   Resp: 22   Temp: 98 4 °F (36 9 °C)   SpO2: 97%

## 2021-01-25 NOTE — ANESTHESIA PROCEDURE NOTES
Peripheral Block    Patient location during procedure: holding area  Start time: 1/25/2021 7:20 AM  Reason for block: at surgeon's request and post-op pain management  Staffing  Anesthesiologist: Karely Regan MD  Performed: anesthesiologist   Preanesthetic Checklist  Completed: patient identified, site marked, surgical consent, pre-op evaluation, timeout performed, IV checked, risks and benefits discussed and monitors and equipment checked  Peripheral Block  Patient position: right lateral decubitus  Prep: ChloraPrep  Patient monitoring: continuous pulse ox and frequent blood pressure checks  Block type: adductor canal block and popliteal  Laterality: left  Injection technique: single-shot  Procedures: ultrasound guided, Ultrasound guidance required for the procedure to increase accuracy and safety of medication placement and decrease risk of complications  and nerve stimulator  Ultrasound permanent image savedropivacaine (NAROPIN) 0 5 % perineural infiltration, 30 mL  ropivacaine (NAROPIN) 0 2% perineural infiltration, 20 mL  midazolam (VERSED) 2 mg/2 mL IV, 2 mg  fentaNYL 50 mcg/mL IV, 100 mcg  Needle  Needle type: Stimuplex   Needle gauge: 22 G  Needle length: 10 cm  Needle localization: anatomical landmarks, nerve stimulator and ultrasound guidance  Assessment  Injection assessment: incremental injection and local visualized surrounding nerve on ultrasound  Paresthesia pain: none  Heart rate change: no  Slow fractionated injection: yes  Post-procedure:  site cleaned  patient tolerated the procedure well with no immediate complications  Additional Notes  ACB in supine position using US, 10ml 0 2% ropi and 10ml 0 5%ropi injected  Popliteal in RLD position using US and stim, 20ml 0 5% ropi injected

## 2021-01-25 NOTE — ANESTHESIA POSTPROCEDURE EVALUATION
Post-Op Assessment Note    CV Status:  Stable  Pain Score: 0    Pain management: adequate     Mental Status:  Awake   Hydration Status:  Euvolemic   PONV Controlled:  Controlled   Airway Patency:  Patent and adequate      Post Op Vitals Reviewed: Yes      Staff: CRNA         No complications documented      BP   134/71   Temp   98 8   Pulse (P) 97 (01/25/21 1151)   Resp   15   SpO2 (P) 100 % (01/25/21 1151)

## 2021-01-25 NOTE — DISCHARGE INSTRUCTIONS
Discharge Instructions - Orthopedics  Betty Gómez 28 y o  female MRN: [id number]  Unit/Bed#: [facility] MAIN      Weight Bearing Status:                                           Non-weight bearing Left lower extremity     DVT prophylaxis  81mg Aspirin twice daily until otherwise stated     Pain:  Continue analgesics as directed    Dressing Instructions:   Please keep clean, dry and intact until follow up     Appt Instructions: If you do not have your appointment, please call the clinic at [phone] t  Otherwise followup as scheduled     Contact the office sooner if you experience any increased numbness/tingling in the extremities  Miscellaneous:  Maintain splint at all times  Must keep splint clean and dry at all times  Contact office if splint becomes damaged or wet

## 2021-01-25 NOTE — PROGRESS NOTES
Pt trying to find a comfortable position for her leg, it feels like "it needs to be held up" keeps repositioning her leg to find the most comfortable spot

## 2021-01-25 NOTE — OP NOTE
OPERATIVE REPORT  PATIENT NAME: Na Merritt    :  1985  MRN: [id number]  Pt Location: [facility] ROOM 03    SURGERY DATE: 2021    Surgeon(s) and Role:     * Jimmy Ramos MD - Primary     * Sudarshan Malave PA-C - Assisting    Preop Diagnosis:  Left ankle trimalleolar fracture    Post-Op Diagnosis Codes:  Same    Procedure(s) (LRB):  OPEN REDUCTION W/ INTERNAL FIXATION (ORIF) LEFT ANKLE TRIMALLEOLAR FRACTURE (Left)    Procedure:  ORIF L ankle trimalleolar fracture, with posterior lip fixation (CPT 23818)  LLE application short leg splint (CPT 66434)    Specimen(s):  * No specimens in log *    Estimated Blood Loss:   50 mL    Drains:  Urethral Catheter Latex 16 Fr  (Active)   Number of days: 0       Anesthesia Type:   General    Operative Indications:  Displaced L ankle trimall fracture in ambulatory patient    Operative Findings:  Satisfactory reduction of malleoli, small impacted psoterior articular surface reduced    Complications:   None    Implants: Synthes 2 4mm 3 hl/7hl condylar plate, [place] Translation 7 hl 1/3 tubular plate, Synthes 4 0 mm cannulated screws x2    Procedure and Technique:  Patient's operative site, laterality, procedure, consent were verified in the preoperative area and the patient was transitioned to the operating room  General anesthesia was provided by the anesthesia team   A nonsterile tourniquet was applied to the left lower extremity and inflated and deflated at appropriate intervals  The patient was turned prone and all bony prominences were padded  The left lower extremity was prepped and draped in the usual sterile fashion  After time-out for safety, the standard posterior lateral approach to the ankle took place  Care was taken to dissect the sural nerve and was retracted and protected throughout the entirety of the case  The peroneal fascia was opened and the muscle was retracted    The FHL fascia was opened and the muscle was retracted off the posterior aspect of the tibia   The posterior malleolus fracture site was easily identified and early callus and healing was removed with pituitary and scalpel and edges were defined  The PITFL was found to be intact  A small area of posterior articular surface was found to be impacted which was reduced with a [place] elevator  Reduction maneuver then took place and the posterior malleolus was reduced with wires and held in place  Reduction was found to be satisfactory and a Synthes condylar plate was contoured and applied to the posterior aspect of the bone and held in place with wires  Cortical screws were placed proximally to provide buttress effect to the posterior piece  A lag screw at the joint was then placed with drilling in standard fashion for 2 7 mm cortical screw  This was found to enhance our reduction and provide compression at the articular surface  Attention was then turned to the fibula fracture  Reduction was obtained with pointed reduction clamp and a Synthes 2 7 mm cortical lag screw was placed in true lag drilling technique  Synthes 7 hole 1/3 tubular plate was then applied to the posterior lateral aspect of the bone  This was held in place proximally distally with wires  Proximal cortical screw was placed distal cancellous screw was placed to reduce plate to bone  Remainder cortical screws were then placed and distal locking screws were placed, cancellous bone screw was replaced with locking screw due to poor purchase  Attention was then turned to the medial malleolus  A curvilinear incision over the medial malleolus was performed  Fracture site was identified taking care to protect saphenous nerve and saphenous vein  The shoulder hook was used to reduce the fracture and wires for the 4 0 mm cannulated screws were advanced across the fracture site    These were drilled for standard fashion into can screws were placed which were found to provide compression at the medial malleolus with the fracture reduction maintained  The ankle was then taken through a dorsiflexion and external rotation stress test which was found to have no medial clear space widening as well as no widening of the syndesmotic area or loss of tibiofibular overlap  At this point, decision was made not to provide syndesmotic fixation  Sterile dressing consisted of steri strips, adaptic, sterile gauze, ABD pad, sterile webrill  The ankle was placed in a well padded posterior ankle splint with stirrup with ankle at neutral   Wounds were copiously irrigated with sterile saline  All final counts, including but not limited to instrument, sponge, needle counts were correct  The patient was extubated and transitioned to the PACU in stable condition  There were no immediate complications  No qualified resident was available for the procedure  Critical assistance from Syd Le PA-C was required for all elements of the case including soft tissue retraction, positioning, passage of instruments, assistance with reduction  This was particularly important given BMI of 33 53  Patient will be nonweightbearing on left lower extremity for an anticipated 6 weeks pending bony union  The patient require deep vein thrombosis prophylaxis for 6 weeks    The patient will see me in clinic in 2 weeks for suture removal     Patient Disposition:  PACU     SIGNATURE: No Resendiz MD  DATE: January 25, 2021  TIME: 11:55 AM

## 2021-01-25 NOTE — PROGRESS NOTES
Reviewed D/C instructions with patient and cousin  All questions answered  VSS & pain tolerable at 5/10  Pt  OK for DC home

## 2021-02-09 ENCOUNTER — OFFICE VISIT (OUTPATIENT)
Dept: OBGYN CLINIC | Facility: CLINIC | Age: 36
End: 2021-02-09

## 2021-02-09 VITALS
DIASTOLIC BLOOD PRESSURE: 79 MMHG | HEART RATE: 103 BPM | SYSTOLIC BLOOD PRESSURE: 126 MMHG | HEIGHT: 67 IN | WEIGHT: 214 LBS | BODY MASS INDEX: 33.59 KG/M2

## 2021-02-09 DIAGNOSIS — Z48.89 AFTERCARE FOLLOWING SURGERY: ICD-10-CM

## 2021-02-09 DIAGNOSIS — Z87.81 STATUS POST OPEN REDUCTION WITH INTERNAL FIXATION (ORIF) OF FRACTURE OF ANKLE: Primary | ICD-10-CM

## 2021-02-09 DIAGNOSIS — Z98.890 STATUS POST OPEN REDUCTION WITH INTERNAL FIXATION (ORIF) OF FRACTURE OF ANKLE: Primary | ICD-10-CM

## 2021-02-09 DIAGNOSIS — S82.852D CLOSED TRIMALLEOLAR FRACTURE OF LEFT ANKLE WITH ROUTINE HEALING, SUBSEQUENT ENCOUNTER: ICD-10-CM

## 2021-02-09 PROCEDURE — 99024 POSTOP FOLLOW-UP VISIT: CPT | Performed by: ORTHOPAEDIC SURGERY

## 2021-02-09 RX ORDER — ACETAMINOPHEN 325 MG/1
650 TABLET ORAL EVERY 6 HOURS PRN
COMMUNITY

## 2021-02-09 NOTE — PATIENT INSTRUCTIONS
Continue nonweightbearing left lower extremity  Follow-up in 1 month   continue aspirin twice daily for deep vein thrombosis prophylaxis

## 2021-02-09 NOTE — PROGRESS NOTES
Assessment/Plan:  1  Closed trimalleolar fracture of left ankle with routine healing, subsequent encounter       Scribe Attestation    I,:  Joey Soto am acting as a scribe while in the presence of the attending physician :       I,:  Dalia Douglas MD personally performed the services described in this documentation    as scribed in my presence :         Khushboo Mckeon is doing as well as expected two weeks after undergoing a left ankle ORIF for trimalleolar fracture  Her sutures were removed today in the office and Steri-Strips placed over her operative incision  At this time, she may initiate formal therapy with focus on active and passive range of motion  I provided her with a referral for this today  She should continue aspirin twice daily for DVT prophylaxis  She understands she should also continue to remain nonweightbearing  She was placed into a podus boot today in the office by our brace fitter  I would like to see her back in four weeks for repeat clinical evaluation with left ankle x-rays on arrival     Subjective: Follow-up evaluation two weeks status post ORIF for left ankle trimalleolar fracture    Patient ID: Tamie Winslow is a 28 y o  female who presents today for follow-up evaluation two weeks status post ORIF for left ankle trimalleolar fracture  She states that she has been doing well  She does complain of aching soreness diffusely about the left ankle  She has been compliant with using aspirin for DVT prophylaxis  She has been using Tylenol for pain relief and states that she has not needed to use her narcotic pain medication  She has been immobilized in compliant with her nonweightbearing status  She denies any new injury or trauma  She denies any distal paresthesias of the lower extremity  She denies any fever, chills, sweats or symptoms of infection  She denies any tenderness of the calf  Review of Systems   Constitutional: Positive for activity change   Negative for chills, fever and unexpected weight change  HENT: Negative for hearing loss, nosebleeds and sore throat  Eyes: Negative for pain, redness and visual disturbance  Respiratory: Negative for cough, shortness of breath and wheezing  Cardiovascular: Negative for chest pain, palpitations and leg swelling  Gastrointestinal: Negative for abdominal pain, nausea and vomiting  Endocrine: Negative for polydipsia and polyuria  Genitourinary: Negative for dysuria and hematuria  Musculoskeletal: Positive for arthralgias, joint swelling and myalgias  See HPI   Skin: Negative for rash and wound  Neurological: Negative for dizziness, numbness and headaches  Psychiatric/Behavioral: Negative for decreased concentration and suicidal ideas  The patient is not nervous/anxious  History reviewed  No pertinent past medical history  Past Surgical History:   Procedure Laterality Date     SECTION      ND OPEN TX TRIMALLEOLAR ANKLE FX W FIX PST LIP Left 2021    Procedure: OPEN REDUCTION W/ INTERNAL FIXATION (ORIF) LEFT ANKLE TRIMALLEOLAR FRACTURE;  Surgeon: Kane Clayton MD;  Location: [facility];  Service: Orthopedics       History reviewed  No pertinent family history      Social History     Occupational History    Not on file   Tobacco Use    Smoking status: Never Smoker    Smokeless tobacco: Never Used   Substance and Sexual Activity    Alcohol use: Not Currently    Drug use: Never    Sexual activity: Never         Current Outpatient Medications:     acetaminophen (TYLENOL) 325 mg tablet, Take 650 mg by mouth every 6 (six) hours as needed for mild pain, Disp: , Rfl:     aspirin (ECOTRIN LOW STRENGTH) 81 mg EC tablet, Take 81 mg by mouth daily, Disp: , Rfl:     cyclobenzaprine (FLEXERIL) 10 mg tablet, Take 1 tablet (10 mg total) by mouth 2 (two) times a day as needed for muscle spasms (Patient not taking: Reported on 2021), Disp: 20 tablet, Rfl: 0    naloxone (NARCAN) 4 mg/0 1 mL nasal spray, Administer 1 spray into a nostril  If no response after 2-3 minutes, give another dose in the other nostril using a new spray  (Patient not taking: Reported on 2/9/2021), Disp: 1 each, Rfl: 1    naproxen (NAPROSYN) 500 mg tablet, Take 1 tablet (500 mg total) by mouth 2 (two) times a day with meals (Patient not taking: Reported on 2/9/2021), Disp: 20 tablet, Rfl: 0    oxyCODONE (ROXICODONE) 5 mg immediate release tablet, Take 1 tablet (5 mg total) by mouth every 4 (four) hours as needed for moderate painMax Daily Amount: 30 mg (Patient not taking: Reported on 2/9/2021), Disp: 30 tablet, Rfl: 0    oxyCODONE (ROXICODONE) 5 mg immediate release tablet, Take 1 tablet (5 mg total) by mouth every 6 (six) hours as needed for moderate painMax Daily Amount: 20 mg (Patient not taking: Reported on 2/9/2021), Disp: 30 tablet, Rfl: 0    No Known Allergies    Objective:  Vitals:    02/09/21 1106   BP: 126/79   Pulse: 103       Body mass index is 33 52 kg/m²  Left Ankle Exam   Swelling: mild (appropriate for stage in postoperative course)    Other   Erythema: absent  Scars: present (healing operative incisions that are clean, dry, in tact and show no signs of infection)  Sensation: normal  Pulse: present    Comments:  Able to freely move toes  AROM in tact in all planes  Full ROM and strength testing deferred due to fracture  Gabriella's:  Negative          Observations   Left Ankle/Foot   Positive for adhesive scar (healing operative incisions that are clean, dry, in tact and show no signs of infection)  Physical Exam  Vitals signs and nursing note reviewed  Constitutional:       Appearance: She is well-developed  HENT:      Head: Normocephalic and atraumatic  Eyes:      General: No scleral icterus  Conjunctiva/sclera: Conjunctivae normal    Neck:      Musculoskeletal: Normal range of motion and neck supple  Cardiovascular:      Rate and Rhythm: Normal rate     Pulmonary:      Effort: Pulmonary effort is normal  No respiratory distress  Musculoskeletal:      Comments: As noted in HPI   Skin:     General: Skin is warm and dry  Neurological:      Mental Status: She is alert and oriented to person, place, and time     Psychiatric:         Behavior: Behavior normal          No xrays obtained today    Reynaldo Beltran MD

## 2021-02-10 DIAGNOSIS — Z87.81 STATUS POST OPEN REDUCTION WITH INTERNAL FIXATION (ORIF) OF FRACTURE OF ANKLE: Primary | ICD-10-CM

## 2021-02-10 DIAGNOSIS — Z98.890 STATUS POST OPEN REDUCTION WITH INTERNAL FIXATION (ORIF) OF FRACTURE OF ANKLE: Primary | ICD-10-CM

## 2021-02-16 ENCOUNTER — EVALUATION (OUTPATIENT)
Dept: PHYSICAL THERAPY | Facility: CLINIC | Age: 36
End: 2021-02-16
Payer: COMMERCIAL

## 2021-02-16 DIAGNOSIS — S82.852D CLOSED TRIMALLEOLAR FRACTURE OF LEFT ANKLE WITH ROUTINE HEALING, SUBSEQUENT ENCOUNTER: ICD-10-CM

## 2021-02-16 PROCEDURE — 97161 PT EVAL LOW COMPLEX 20 MIN: CPT | Performed by: PHYSICAL THERAPIST

## 2021-02-16 PROCEDURE — 97110 THERAPEUTIC EXERCISES: CPT | Performed by: PHYSICAL THERAPIST

## 2021-02-16 PROCEDURE — 97010 HOT OR COLD PACKS THERAPY: CPT | Performed by: PHYSICAL THERAPIST

## 2021-02-16 NOTE — PROGRESS NOTES
PT Evaluation     Today's date: 2021  Patient name: Mago Gannon  : 1985  MRN: [id number]  Referring provider: Chavez Berger MD  Dx:   Encounter Diagnosis     ICD-10-CM    1  Closed trimalleolar fracture of left ankle with routine healing, subsequent encounter  [id number] [id number] Ambulatory referral to Physical Therapy                  Assessment  Assessment details: Mago Gannon is a 28 y o  female presenting as an outpatient to [place] [address] PT s/p L ankle trimalleolar fracture (2021) f/b ORIF (2021)  Pt presents w/ pain, edema, impaired gait, and decreased ROM significantly limiting pt's functional ability  Although not assessed at IE due to proximity to surgery, pt will also present w/ decreased strength due to the nature of injury/surgery  Pt will benefit from skilled PT services to address the above deficits in order to max function to allow pt to achieve goals in PT  Thank you for the referral of this pt  Impairments: abnormal gait, abnormal muscle tone, abnormal or restricted ROM, activity intolerance, impaired physical strength, lacks appropriate home exercise program, pain with function and weight-bearing intolerance  Understanding of Dx/Px/POC: good   Prognosis: good    Goals  ST  Pain decreased by 25% in 4-6 weeks  2  ROM increased by 25% in 4-6 weeks  LT  Decrease pain to 1-2/10 at worst by d/c   2  Increase ROM to [facility] for all deficient movements by d/c   3  Strength increased to 5 for all deficient muscle groups by d/c   4  IADL performance increased to max function by d/c   5  Recreational performance increased to max function by d/c   6  Ambulation/stair climbing improved to max level of function by d/c      Plan  Planned modality interventions: cryotherapy and TENS  Other planned modality interventions: other modalities PRN  Planned therapy interventions: abdominal trunk stabilization, ADL retraining, IADL retraining, flexibility, functional ROM exercises, gait training, graded exercise, home exercise program, manual therapy, joint mobilization, neuromuscular re-education, patient education, strengthening, therapeutic exercise, therapeutic activities and balance/weight bearing training  Other planned therapy interventions: other interventions PRN  Frequency: 1-2x/week  Duration in weeks: 6  Plan of Care beginning date: 2021  Plan of Care expiration date: 3/30/2021  Treatment plan discussed with: patient        Subjective Evaluation    History of Present Illness  Date of onset: 2021  Date of surgery: 2021  Mechanism of injury: Pt reports to IE wearing CAM boot and using b/l axillary crutches s/p ORIF for L trimalleolar fracture as a result of slip and fall on ice  Pt denies any medical complications w/ surgery  Pt reports intermittent numbness/parasthesias in toes  Pt reports taking ASA preventively for DVT since surgery  Pain  Current pain rating: 3  At best pain ratin  At worst pain rating: 3  Relieving factors: ice  Exacerbated by: excessive movement such as rolling over in bed, gravity dependent position for extended periods of time  Social Support  Steps to enter house: no (goes in through garage)  Stairs in house: yes (scoots on buttocks)   Lives in: multiple-level home  Lives with: cousins  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  Patient Goals  Patient goals for therapy: decreased pain  Patient goal: Resume walking        Objective     Active Range of Motion   Left Ankle/Foot   Dorsiflexion (ke): -21 degrees with pain  Dorsiflexion (kf): -10 degrees with pain  Plantar flexion: 31 degrees with pain  Inversion: 4 degrees with pain  Eversion: -2 degrees with pain    Passive Range of Motion   Left Ankle/Foot    Dorsiflexion (ke): -15 degrees with pain  Dorsiflexion (kf): -10 degrees with pain  Plantar flexion: 40 degrees with pain  Inversion: 6 degrees with pain  Eversion: 2 degrees with pain    General Comments:       Ankle/Foot Comments   (-) Gabriella's    Palpation: Tenderness w/ palpation to anterior ankle joint, m/l ankle joint; hypertonicity of gastroc/soleus musculature; tenderness/hypertonicity w/ palpation to anterior tib musculature    Observation: 2 incisions- 1 lateral incision is 11 cm long and 1 medial incision is 8 cm long   Both incisions w/ steri strips in place and appear to be healing well w/o s/s of infection; Edema t/o ankle and dorsum of foot w/ figure 8 measurements as follows (R/L): 54 cm/57 cm    Gait: Pt ambulates w/ b/l axillary crutches, NWB w/ CAM boot      Flowsheet Rows      Most Recent Value   PT/OT G-Codes   Current Score  17   Projected Score  56              Daily Treatment Diary    EPOC: 3/30/2021  Precautions: NWB  Co- Morbidities:   Patient Active Problem List   Diagnosis    Closed trimalleolar fracture of left ankle    Class 1 obesity in adult    Status post open reduction with internal fixation (ORIF) of fracture of ankle         Manual 2/16           Consider TPR/STM to gastroc/soleus ant tib NV            consider k-tape             Total Time               Exercise Diary 2/16           THEREX        Pt education/HEP instruct/handout 8'       Recumbent Bike w/ exag ankle AROM             Gastroc/ Soleus Stretch- long sit             Seated HR/TR             Ankle AROM DF/PF/EV/IV        Active HS stretch w/ ankle pumps        NEURO RE-ED        Towel curls             Neutral Ankles             Seated BAPS             clamshells                                                                               Gait Training                                           Modalities 2/16           CP 10' elevated

## 2021-02-22 ENCOUNTER — APPOINTMENT (OUTPATIENT)
Dept: PHYSICAL THERAPY | Facility: CLINIC | Age: 36
End: 2021-02-22
Payer: COMMERCIAL

## 2021-02-23 ENCOUNTER — APPOINTMENT (OUTPATIENT)
Dept: PHYSICAL THERAPY | Facility: CLINIC | Age: 36
End: 2021-02-23
Payer: COMMERCIAL

## 2021-02-25 ENCOUNTER — OFFICE VISIT (OUTPATIENT)
Dept: PHYSICAL THERAPY | Facility: CLINIC | Age: 36
End: 2021-02-25
Payer: COMMERCIAL

## 2021-02-25 DIAGNOSIS — S82.852D CLOSED TRIMALLEOLAR FRACTURE OF LEFT ANKLE WITH ROUTINE HEALING, SUBSEQUENT ENCOUNTER: Primary | ICD-10-CM

## 2021-02-25 PROCEDURE — 97110 THERAPEUTIC EXERCISES: CPT

## 2021-02-25 PROCEDURE — 97140 MANUAL THERAPY 1/> REGIONS: CPT

## 2021-02-25 PROCEDURE — 97112 NEUROMUSCULAR REEDUCATION: CPT

## 2021-02-25 NOTE — PROGRESS NOTES
Daily Note     Today's date: 2021  Patient name: Cheryle Early  : 1985  MRN: [id number]  Referring provider: Nancy Layton MD  Dx:   Encounter Diagnosis     ICD-10-CM    1  Closed trimalleolar fracture of left ankle with routine healing, subsequent encounter  [id number]                   Subjective: Pt reports minimal pain in L ankle, she reports being afraid to perform HEP at home as she feels she is going to mess something up  Objective: See treatment diary below      Assessment: Tolerated program well needing visual/tactile cues for correct exercise technique  Pain with seated toe raises present  Reviewed ROM exercises for home so pt feels comfortable performing  Plan: Continue with current POC to address pt deficits        Daily Treatment Diary    EPOC: 3/30/2021  Precautions: NWB  Co- Morbidities:   Patient Active Problem List   Diagnosis    Closed trimalleolar fracture of left ankle    Class 1 obesity in adult    Status post open reduction with internal fixation (ORIF) of fracture of ankle         Manual          Consider TPR/STM to gastroc/soleus ant tib NV  TB 16'          consider k-tape             Total Time    16'           Exercise Diary          THEREX        Pt education/HEP instruct/handout 8'       Recumbent Bike w/ exag ankle AROM    NV         Gastroc/ Soleus Stretch- long sit    30"x3         Seated HR/TR    15x HR only         Ankle AROM DF/PF/EV/IV  20x ea direction      Active HS stretch w/ ankle pumps  10 pumps x3      NEURO RE-ED        Towel curls    10x         Neutral Ankles    5" x10         Seated BAPS    20x a/p, 20x m/l         clamshells                                                                               Gait Training                                           Modalities            CP 10' elevated

## 2021-03-01 ENCOUNTER — OFFICE VISIT (OUTPATIENT)
Dept: PHYSICAL THERAPY | Facility: CLINIC | Age: 36
End: 2021-03-01
Payer: COMMERCIAL

## 2021-03-01 DIAGNOSIS — S82.852D CLOSED TRIMALLEOLAR FRACTURE OF LEFT ANKLE WITH ROUTINE HEALING, SUBSEQUENT ENCOUNTER: Primary | ICD-10-CM

## 2021-03-01 PROCEDURE — 97110 THERAPEUTIC EXERCISES: CPT | Performed by: PHYSICAL THERAPIST

## 2021-03-01 PROCEDURE — 97112 NEUROMUSCULAR REEDUCATION: CPT | Performed by: PHYSICAL THERAPIST

## 2021-03-01 PROCEDURE — 97140 MANUAL THERAPY 1/> REGIONS: CPT | Performed by: PHYSICAL THERAPIST

## 2021-03-01 NOTE — PROGRESS NOTES
Daily Note     Today's date: 3/1/2021  Patient name: Digna Flores  : 1985  MRN: [id number]  Referring provider: Kathy Keyes MD  Dx:   Encounter Diagnosis     ICD-10-CM    1  Closed trimalleolar fracture of left ankle with routine healing, subsequent encounter  [id number] [id number]                   Subjective: Pt seems less apprehensive for skilled PT tx today  Objective: See treatment diary below      Assessment: Progressed ROM ex this visit w/ good tolerance- pt able to tolerate TR today  Plan to add recumbent bike NV to increase ROM  Plan: Continue with current POC to address pt deficits        Daily Treatment Diary    EPOC: 3/30/2021  Precautions: NWB  Co- Morbidities:   Patient Active Problem List   Diagnosis    Closed trimalleolar fracture of left ankle    Class 1 obesity in adult    Status post open reduction with internal fixation (ORIF) of fracture of ankle         Manual 2/16  2/25  3/1       Consider TPR/STM to gastroc/soleus ant tib NV  TB 16'  RB        consider k-tape             Total Time    16'  14'         Exercise Diary 2/16  2/25 3/1       THEREX        Pt education/HEP instruct/handout 8'       L ankle PROM   10'     Recumbent Bike w/ exag ankle AROM    NV  NV       Gastroc/ Soleus Stretch- long sit    30"x3  30"x3       Seated HR/TR    15x HR only  15x ea       Ankle AROM DF/PF/EV/IV  20x ea direction HEP     Active HS stretch w/ ankle pumps  10 pumps x3 10 pumps x3     NEURO RE-ED        Towel curls    10x  20x + toe ext       Neutral Ankles    5" x10  10"x10       Seated BAPS    20x a/p, 20x m/l  20x a/p and m/l       clamshells                                                                               Gait Training                                           Modalities            CP 10' elevated                                                Daily Note     Today's date: 3/1/2021  Patient name: Digna Flores  : 1985  MRN: [id number]  Referring provider: Anne Be Isreal Oakley MD  Dx:   Encounter Diagnosis     ICD-10-CM    1  Closed trimalleolar fracture of left ankle with routine healing, subsequent encounter  S81 [id number]                   Subjective: Pt reports minimal pain in L ankle, she reports being afraid to perform HEP at home as she feels she is going to mess something up  Objective: See treatment diary below      Assessment: Tolerated program well needing visual/tactile cues for correct exercise technique  Pain with seated toe raises present  Reviewed ROM exercises for home so pt feels comfortable performing  Plan: Continue with current POC to address pt deficits        Daily Treatment Diary    EPOC: 3/30/2021  Precautions: NWB  Co- Morbidities:   Patient Active Problem List   Diagnosis    Closed trimalleolar fracture of left ankle    Class 1 obesity in adult    Status post open reduction with internal fixation (ORIF) of fracture of ankle         Manual 2/16  2/25 3/1       Consider TPR/STM to gastroc/soleus ant tib NV  TB 16'  RB        consider k-tape             Total Time    16'           Exercise Diary 2/16  2/25  3/1       THEREX        Pt education/HEP instruct/handout 8'       Recumbent Bike w/ exag ankle AROM    NV  NV       Gastroc/ Soleus Stretch- long sit    30"x3  30"x3       Seated HR/TR    15x HR only  15x ea dir        Ankle AROM DF/PF/EV/IV  20x ea direction 20x ea dir     Active HS stretch w/ ankle pumps  10 pumps x3      NEURO RE-ED        Towel curls    10x  20x + extension       Neutral Ankles    5" x10  10"x10       Seated BAPS    20x a/p, 20x m/l  20x ea dir a/p and m/l       clamshells                                                                               Gait Training                                           Modalities 2/16           CP 10' elevated

## 2021-03-04 ENCOUNTER — OFFICE VISIT (OUTPATIENT)
Dept: PHYSICAL THERAPY | Facility: CLINIC | Age: 36
End: 2021-03-04
Payer: COMMERCIAL

## 2021-03-04 DIAGNOSIS — S82.852D CLOSED TRIMALLEOLAR FRACTURE OF LEFT ANKLE WITH ROUTINE HEALING, SUBSEQUENT ENCOUNTER: Primary | ICD-10-CM

## 2021-03-04 PROCEDURE — 97112 NEUROMUSCULAR REEDUCATION: CPT

## 2021-03-04 PROCEDURE — 97110 THERAPEUTIC EXERCISES: CPT

## 2021-03-04 PROCEDURE — 97140 MANUAL THERAPY 1/> REGIONS: CPT

## 2021-03-04 NOTE — PROGRESS NOTES
Daily Note     Today's date: 3/4/2021  Patient name: Na Merritt  : 1985  MRN: [id number]  Referring provider: Juan Luis Sparks MD  Dx:   Encounter Diagnosis     ICD-10-CM    1  Closed trimalleolar fracture of left ankle with routine healing, subsequent encounter  [id number] [id number]                   Subjective: Pt reports soreness after last visit which she was explained this is normal as we are doing more  She reports soreness subsiding  Pt has been performing HEP more regularly at home  Objective: See treatment diary below      Assessment: Trialed recumbent bike but unable to perform 5 minutes due to pain in anterior L ankle  Visual cues for correct exercise specifically with neutral ankles exercise  Improved tissue quality post manuals to L ant tib, triceps surae  Plan: Continue with current POC to address pt deficits        Daily Treatment Diary    EPOC: 3/30/2021  Precautions: NWB  Co- Morbidities:   Patient Active Problem List   Diagnosis    Closed trimalleolar fracture of left ankle    Class 1 obesity in adult    Status post open reduction with internal fixation (ORIF) of fracture of ankle         Manual 2/16  2/25  3/1  3/4     Consider TPR/STM to gastroc/soleus ant tib NV  TB 16'  RB  TB      consider k-tape             Total Time    12'  14'  14'       Exercise Diary 2/16  2/25 3/1  3/4     THEREX        Pt education/HEP instruct/handout 8'       L ankle PROM   10' 10'    Recumbent Bike w/ exag ankle AROM    NV  NV  trialed pain 2'     Gastroc/ Soleus Stretch- long sit    30"x3  30"x3  30"x3      Seated HR/TR    15x HR only  15x ea  15x ea     Ankle AROM DF/PF/EV/IV  20x ea direction HEP     Active HS stretch w/ ankle pumps  10 pumps x3 10 pumps x3 10 pumps x10    NEURO RE-ED        Towel curls    10x  20x + toe ext 20x + toe ext      Neutral Ankles    5" x10  10"x10 10"x10      Seated BAPS    20x a/p, 20x m/l  20x a/p and m/l  20x a/p and m/l     clamshells Gait Training                                           Modalities 2/16           CP 10' elevated

## 2021-03-08 ENCOUNTER — OFFICE VISIT (OUTPATIENT)
Dept: PHYSICAL THERAPY | Facility: CLINIC | Age: 36
End: 2021-03-08
Payer: COMMERCIAL

## 2021-03-08 DIAGNOSIS — Z87.81 STATUS POST OPEN REDUCTION WITH INTERNAL FIXATION (ORIF) OF FRACTURE OF ANKLE: Primary | ICD-10-CM

## 2021-03-08 DIAGNOSIS — Z98.890 STATUS POST OPEN REDUCTION WITH INTERNAL FIXATION (ORIF) OF FRACTURE OF ANKLE: Primary | ICD-10-CM

## 2021-03-08 DIAGNOSIS — S82.852D CLOSED TRIMALLEOLAR FRACTURE OF LEFT ANKLE WITH ROUTINE HEALING, SUBSEQUENT ENCOUNTER: Primary | ICD-10-CM

## 2021-03-08 PROCEDURE — 97140 MANUAL THERAPY 1/> REGIONS: CPT

## 2021-03-08 PROCEDURE — 97112 NEUROMUSCULAR REEDUCATION: CPT

## 2021-03-08 PROCEDURE — 97110 THERAPEUTIC EXERCISES: CPT

## 2021-03-08 NOTE — PROGRESS NOTES
Daily Note     Today's date: 3/8/2021  Patient name: Na Merritt  : 1985  MRN: [id number]  Referring provider: Juan Luis Sparks MD  Dx:   Encounter Diagnosis     ICD-10-CM    1  Closed trimalleolar fracture of left ankle with routine healing, subsequent encounter  [id number] [id number]                   Subjective: Pt reports opening to distal medial incision, no signs of infection, has bandaid to cover      Objective: See treatment diary below      Assessment: Pt educated on signs of infection and verbalized that she understood  Progressed repetitions, no increases in pain  No pain with PROM, improvement in range in comparison to previous visits  Plan: Continue with current POC to address pt  deficits  AAROM Bike NV        Daily Treatment Diary    EPOC: 3/30/2021  Precautions: NWB  Co- Morbidities:   Patient Active Problem List   Diagnosis    Closed trimalleolar fracture of left ankle    Class 1 obesity in adult    Status post open reduction with internal fixation (ORIF) of fracture of ankle         Manual 2/16  2/25  3/1  3/4  3/8   Consider TPR/STM to gastroc/soleus ant tib NV  TB 16'  RB  TB  TB    consider k-tape             Total Time    12'  14'  14'  12'     Exercise Diary 2/16  2/25 3/1  3/4  3/8   THEREX        Pt education/HEP instruct/handout 8'       L ankle PROM   10' 10' 10'   Recumbent Bike w/ exag ankle AROM    NV  NV  trialed pain 2'  NV   Gastroc/ Soleus Stretch- long sit    30"x3  30"x3  30"x3   30"x3   Seated HR/TR    15x HR only  15x ea  15x ea  2x15 ea   Ankle AROM DF/PF/EV/IV  20x ea direction HEP     Active HS stretch w/ ankle pumps  10 pumps x3 10 pumps x3 10 pumps x10 10 pumps x3   NEURO RE-ED        Towel curls    10x  20x + toe ext 20x + toe ext   30x +toe ext   Neutral Ankles    5" x10  10"x10 10"x10   10"x10   Seated BAPS    20x a/p, 20x m/l  20x a/p and m/l  20x a/p and m/l 30x a/p and m/l   clamshells          10"x10 Gait Training                                           Modalities 2/16           CP 10' elevated

## 2021-03-11 ENCOUNTER — OFFICE VISIT (OUTPATIENT)
Dept: PHYSICAL THERAPY | Facility: CLINIC | Age: 36
End: 2021-03-11
Payer: COMMERCIAL

## 2021-03-11 DIAGNOSIS — S82.852D CLOSED TRIMALLEOLAR FRACTURE OF LEFT ANKLE WITH ROUTINE HEALING, SUBSEQUENT ENCOUNTER: Primary | ICD-10-CM

## 2021-03-11 PROCEDURE — 97112 NEUROMUSCULAR REEDUCATION: CPT | Performed by: PHYSICAL THERAPIST

## 2021-03-11 PROCEDURE — 97140 MANUAL THERAPY 1/> REGIONS: CPT | Performed by: PHYSICAL THERAPIST

## 2021-03-11 PROCEDURE — 97110 THERAPEUTIC EXERCISES: CPT | Performed by: PHYSICAL THERAPIST

## 2021-03-11 NOTE — PROGRESS NOTES
Daily Note     Today's date: 3/11/2021  Patient name: Tamie Winslow  : 1985  MRN: [id number]  Referring provider: Ashli Chua MD  Dx:   Encounter Diagnosis     ICD-10-CM    1  Closed trimalleolar fracture of left ankle with routine healing, subsequent encounter  [id number] [id number]                   Subjective: Pt reports incision has not drained since LV  She reports overall pain levels seem to be improving  Pt has f/u w/ surgeon tomorrow, 3/12  Objective: See treatment diary below      Assessment: Pt tolerated tx session well this visit- able to tolerate AROM on bike well  Improved overall mobility  Plan: Continue with current POC to address pt  deficits       Daily Treatment Diary    EPOC: 3/30/2021  Precautions: NWB  Co- Morbidities:   Patient Active Problem List   Diagnosis    Closed trimalleolar fracture of left ankle    Class 1 obesity in adult    Status post open reduction with internal fixation (ORIF) of fracture of ankle         Manual 2/16  2/25  3/1  3/4  3/8 3/11     Consider TPR/STM to gastroc/soleus ant tib NV  TB 16'  RB  TB  TB RB      consider k-tape                Total Time    12'  14'  14'  12' 12'       Exercise Diary 2/16  2/25 3/1  3/4  3/8 3/11     THEREX           Pt education/HEP instruct/handout 8'          L ankle PROM   10' 10' 10' 10'     Recumbent Bike w/ exag ankle AROM    NV  NV  trialed pain 2'  NV 5'     Gastroc/ Soleus Stretch- long sit    30"x3  30"x3  30"x3   30"x3 30"x3     Seated HR/TR    15x HR only  15x ea  15x ea  2x15 ea 30x ea dir b/l      Ankle AROM DF/PF/EV/IV  20x ea direction HEP        Active HS stretch w/ ankle pumps  10 pumps x3 10 pumps x3 10 pumps x10 10 pumps x3 10 pumps x 3     NEURO RE-ED           Towel curls    10x  20x + toe ext 20x + toe ext   30x +toe ext 30x + toe ext     Neutral Ankles    5" x10  10"x10 10"x10   10"x10 10"x10     Seated BAPS    20x a/p, 20x m/l  20x a/p and m/l  20x a/p and m/l 30x a/p and m/l 30 a/p and m/l filemon          10"x10 10"x10                                                                                      Gait Training                                                    Modalities 2/16           CP 10' elevated

## 2021-03-12 ENCOUNTER — HOSPITAL ENCOUNTER (OUTPATIENT)
Dept: RADIOLOGY | Facility: HOSPITAL | Age: 36
Discharge: HOME | End: 2021-03-12
Attending: ORTHOPAEDIC SURGERY

## 2021-03-12 ENCOUNTER — APPOINTMENT (OUTPATIENT)
Dept: RADIOLOGY | Facility: CLINIC | Age: 36
End: 2021-03-12
Payer: COMMERCIAL

## 2021-03-12 ENCOUNTER — OFFICE VISIT (OUTPATIENT)
Dept: OBGYN CLINIC | Facility: CLINIC | Age: 36
End: 2021-03-12

## 2021-03-12 VITALS
HEIGHT: 67 IN | HEART RATE: 82 BPM | DIASTOLIC BLOOD PRESSURE: 71 MMHG | WEIGHT: 214 LBS | BODY MASS INDEX: 33.59 KG/M2 | SYSTOLIC BLOOD PRESSURE: 113 MMHG

## 2021-03-12 DIAGNOSIS — Z87.81 STATUS POST OPEN REDUCTION WITH INTERNAL FIXATION (ORIF) OF FRACTURE OF ANKLE: Primary | ICD-10-CM

## 2021-03-12 DIAGNOSIS — Z98.890 STATUS POST OPEN REDUCTION WITH INTERNAL FIXATION (ORIF) OF FRACTURE OF ANKLE: Primary | ICD-10-CM

## 2021-03-12 DIAGNOSIS — Z98.890 STATUS POST OPEN REDUCTION WITH INTERNAL FIXATION (ORIF) OF FRACTURE OF ANKLE: ICD-10-CM

## 2021-03-12 DIAGNOSIS — Z87.81 STATUS POST OPEN REDUCTION WITH INTERNAL FIXATION (ORIF) OF FRACTURE OF ANKLE: ICD-10-CM

## 2021-03-12 PROCEDURE — 99024 POSTOP FOLLOW-UP VISIT: CPT | Performed by: ORTHOPAEDIC SURGERY

## 2021-03-12 PROCEDURE — 73610 X-RAY EXAM OF ANKLE: CPT

## 2021-03-12 NOTE — PATIENT INSTRUCTIONS
Begin weight bearing as tolerated left lower extremity in CAM boot  May stop aspirin  Follow-up in 3 weeks

## 2021-03-12 NOTE — PROGRESS NOTES
Orthopaedics Office Visit - Postop Patient Visit    ASSESSMENT/PLAN:    Assessment:   6 wk S/P left ankle trimalleolar fracture , healed    Plan:     Begin WBAT LLE in CAM boot  Continue with Physical Therapy - patient has some limited range of motion, patient does admit she has not been performing as much home exercise for ankle motion as she would like to  CAM Boot provided to patient does not have to wear it at night  May transition to sneaker in 2 weeks  May begin driving     To Do Next Visit:  Follow-up in 3 weeks,  Repeat ankle x-rays, consideration for return to work duties    _____________________________________________________  CHIEF COMPLAINT:  Chief Complaint   Patient presents with    Left Ankle - Follow-up         SUBJECTIVE:  iDanna Davies is a 28 y o  female who presents here today 6 weeks s/p ORIF for left ankle trimalleolar fracture  She states that she has been doing well  She does complain of aching soreness diffusely about the left ankle  She has been immobilized in compliant with her nonweightbearing status  She has been complaint with going to Physical Therapy  PAST MEDICAL HISTORY:  History reviewed  No pertinent past medical history      PAST SURGICAL HISTORY:  Past Surgical History:   Procedure Laterality Date     SECTION      NC OPEN TX TRIMALLEOLAR ANKLE FX W FIX PST LIP Left 2021    Procedure: OPEN REDUCTION W/ INTERNAL FIXATION (ORIF) LEFT ANKLE TRIMALLEOLAR FRACTURE;  Surgeon: Jo Ann Basilio MD;  Location: [facility];  Service: Orthopedics       FAMILY HISTORY:  Family History   Problem Relation Age of Onset    Cancer Father     Diabetes Maternal Grandmother     Stroke Maternal Grandmother     Heart disease Maternal Grandmother        SOCIAL HISTORY:  Social History     Tobacco Use    Smoking status: Never Smoker    Smokeless tobacco: Never Used   Substance Use Topics    Alcohol use: Not Currently    Drug use: Never       MEDICATIONS:    Current Outpatient Medications:     aspirin (ECOTRIN LOW STRENGTH) 81 mg EC tablet, Take 81 mg by mouth daily, Disp: , Rfl:     acetaminophen (TYLENOL) 325 mg tablet, Take 650 mg by mouth every 6 (six) hours as needed for mild pain, Disp: , Rfl:     cyclobenzaprine (FLEXERIL) 10 mg tablet, Take 1 tablet (10 mg total) by mouth 2 (two) times a day as needed for muscle spasms (Patient not taking: Reported on 2/9/2021), Disp: 20 tablet, Rfl: 0    naloxone (NARCAN) 4 mg/0 1 mL nasal spray, Administer 1 spray into a nostril  If no response after 2-3 minutes, give another dose in the other nostril using a new spray  (Patient not taking: Reported on 2/9/2021), Disp: 1 each, Rfl: 1    naproxen (NAPROSYN) 500 mg tablet, Take 1 tablet (500 mg total) by mouth 2 (two) times a day with meals (Patient not taking: Reported on 2/9/2021), Disp: 20 tablet, Rfl: 0    oxyCODONE (ROXICODONE) 5 mg immediate release tablet, Take 1 tablet (5 mg total) by mouth every 4 (four) hours as needed for moderate painMax Daily Amount: 30 mg (Patient not taking: Reported on 2/9/2021), Disp: 30 tablet, Rfl: 0    oxyCODONE (ROXICODONE) 5 mg immediate release tablet, Take 1 tablet (5 mg total) by mouth every 6 (six) hours as needed for moderate painMax Daily Amount: 20 mg (Patient not taking: Reported on 2/9/2021), Disp: 30 tablet, Rfl: 0    ALLERGIES:  No Known Allergies    REVIEW OF SYSTEMS:  MSK: Left Ankle pain, swelling  Neuro: Normal  Pertinent items are otherwise noted in HPI  A comprehensive review of systems was otherwise negative      LABS:  HgA1c: No results found for: HGBA1C  BMP:   Lab Results   Component Value Date    CALCIUM 8 8 06/04/2019    K 4 7 06/04/2019    CO2 26 06/04/2019     06/04/2019    BUN 9 06/04/2019    CREATININE 0 67 06/04/2019     CBC: No components found for: CBC    _____________________________________________________  PHYSICAL EXAMINATION:  Vital signs: /71   Pulse 82   Ht 5' 7" (1 702 m)   Wt 97 1 kg (214 lb)   BMI 33 52 kg/m²   General: No acute distress, awake and alert  Psychiatric: Mood and affect appear appropriate  HEENT: Trachea Midline, No torticollis, no apparent facial trauma  Cardiovascular: No audible murmurs; Extremities appear perfused  Pulmonary: No audible wheezing or stridor  Skin: No open lesions; see further details (if any) below    MUSCULOSKELETAL EXAMINATION:  Extremities:  Left Ankle  Limited ROM due to stiffness  Mild Swelling at lateral ankle  No bony tenderness  Well healed incisions without evidence of infection or drainage  Thigh and calf and foot compressible without pain       _____________________________________________________  STUDIES REVIEWED:  I personally reviewed the images and interpretation is as follows: healed trimalleolar fracture with orthopedic hardware in good alignment      PROCEDURES PERFORMED:  Procedures    Scribe Attestation    I,:  Kurt Hart am acting as a scribe while in the presence of the attending physician :       I,:  Shawn Huggins MD personally performed the services described in this documentation    as scribed in my presence :

## 2021-03-15 ENCOUNTER — OFFICE VISIT (OUTPATIENT)
Dept: PHYSICAL THERAPY | Facility: CLINIC | Age: 36
End: 2021-03-15
Payer: COMMERCIAL

## 2021-03-15 DIAGNOSIS — S82.852D CLOSED TRIMALLEOLAR FRACTURE OF LEFT ANKLE WITH ROUTINE HEALING, SUBSEQUENT ENCOUNTER: Primary | ICD-10-CM

## 2021-03-15 PROCEDURE — 97116 GAIT TRAINING THERAPY: CPT

## 2021-03-15 PROCEDURE — 97140 MANUAL THERAPY 1/> REGIONS: CPT

## 2021-03-15 PROCEDURE — 97110 THERAPEUTIC EXERCISES: CPT

## 2021-03-15 PROCEDURE — 97112 NEUROMUSCULAR REEDUCATION: CPT

## 2021-03-15 NOTE — PROGRESS NOTES
Daily Note     Today's date: 3/15/2021  Patient name: Lara Best  : 1985  MRN: [id number]  Referring provider: Ba Driver MD  Dx:   Encounter Diagnosis     ICD-10-CM    1  Closed trimalleolar fracture of left ankle with routine healing, subsequent encounter  [id number]                   Subjective: Pt is ambulating in CAM boot and is WBAT as per MD request        Objective: See treatment diary below      Assessment: Added weight shifting in // bars with good tolerance, pt was able to use single UE or no UE for balance  Gait training in // bars and b/l crutches tolerable of step to gait pattern and able to tolerate step through by end of training  Decreased TP to medial gastroc post manuals  Pt reports no increase in pain t/o visit  Plan: Continue with current POC to address pt deficits        Daily Treatment Diary    EPOC: 3/30/2021  Precautions: NWB  Co- Morbidities:   Patient Active Problem List   Diagnosis    Closed trimalleolar fracture of left ankle    Class 1 obesity in adult    Status post open reduction with internal fixation (ORIF) of fracture of ankle         Manual 2/16  2/25  3/1  3/4  3/8 3/11 3/15    Consider TPR/STM to gastroc/soleus ant tib NV  TB 16'  RB  TB  TB RB TB     consider k-tape                Total Time    12'  14'  14'  12' 12' 8'      Exercise Diary 2/16  2/25 3/1  3/4  3/8 3/11 3/15    THEREX           Pt education/HEP instruct/handout 8'          L ankle PROM   10' 10' 10' 10' 8' + great toe    Recumbent Bike w/ exag ankle AROM    NV  NV  trialed pain 2'  NV 5' 5'    Gastroc/ Soleus Stretch- long sit    30"x3  30"x3  30"x3   30"x3 30"x3 NP-resume NV    Seated HR/TR    15x HR only  15x ea  15x ea  2x15 ea 30x ea dir b/l  NP-resumb NV    Ankle AROM DF/PF/EV/IV  20x ea direction HEP        Active HS stretch w/ ankle pumps  10 pumps x3 10 pumps x3 10 pumps x10 10 pumps x3 10 pumps x 3 10 pumps x 3    NEURO RE-ED           Towel curls    10x  20x + toe ext 20x + toe ext   30x +toe ext 30x + toe ext     Neutral Ankles    5" x10  10"x10 10"x10   10"x10 10"x10 10"x10    Seated BAPS    20x a/p, 20x m/l  20x a/p and m/l  20x a/p and m/l 30x a/p and m/l 30 a/p and m/l  30x a/p and m/l    clamshells          10"x10 10"x10     Weight shifts in //bars m/l, a/p            10"x10/10"x10                                                                     Gait Training                 gait training //bars and w/ crutches             8' step to and step through                       Modalities 2/16           CP 10' elevated

## 2021-03-18 ENCOUNTER — OFFICE VISIT (OUTPATIENT)
Dept: PHYSICAL THERAPY | Facility: CLINIC | Age: 36
End: 2021-03-18
Payer: COMMERCIAL

## 2021-03-18 DIAGNOSIS — S82.852D CLOSED TRIMALLEOLAR FRACTURE OF LEFT ANKLE WITH ROUTINE HEALING, SUBSEQUENT ENCOUNTER: Primary | ICD-10-CM

## 2021-03-18 PROCEDURE — 97116 GAIT TRAINING THERAPY: CPT

## 2021-03-18 PROCEDURE — 97140 MANUAL THERAPY 1/> REGIONS: CPT

## 2021-03-18 PROCEDURE — 97110 THERAPEUTIC EXERCISES: CPT

## 2021-03-18 PROCEDURE — 97112 NEUROMUSCULAR REEDUCATION: CPT

## 2021-03-18 NOTE — PROGRESS NOTES
Daily Note     Today's date: 3/18/2021  Patient name: Melanie David  : 1985  MRN: [id number]  Referring provider: Lian Vincent MD  Dx:   Encounter Diagnosis     ICD-10-CM    1  Closed trimalleolar fracture of left ankle with routine healing, subsequent encounter  [id number]                   Subjective: Pt reports no pain in L ankle, no increases in pain after progressions LV  Objective: See treatment diary below      Assessment: Tolerated therapy session well w/o increasing pain, unable to put full weight into L LE at this time  Pt able to perform step through gait pattern with b/l crutches by end of visit with visual/verbal cues  Plan: Continue with current POC to address pt deficits        Daily Treatment Diary    EPOC: 3/30/2021  Precautions: NWB  Co- Morbidities:   Patient Active Problem List   Diagnosis    Closed trimalleolar fracture of left ankle    Class 1 obesity in adult    Status post open reduction with internal fixation (ORIF) of fracture of ankle         Manual 2/16  2/25  3/1  3/4  3/8 3/11 3/15 3/18   Consider TPR/STM to gastroc/soleus ant tib NV  TB 16'  RB  TB  TB RB TB TB    consider k-tape                Total Time    12'  14'  14'  12' 12' 8' 8'     Exercise Diary 2/16  2/25 3/1  3/4  3/8 3/11 3/15 3/18   THEREX           Pt education/HEP instruct/handout 8'          L ankle PROM   10' 10' 10' 10' 8' + great toe 8' +great toe   Recumbent Bike w/ exag ankle AROM    NV  NV  trialed pain 2'  NV 5' 5' 5'   Gastroc/ Soleus Stretch- long sit    30"x3  30"x3  30"x3   30"x3 30"x3 NP-resume NV    Seated HR/TR    15x HR only  15x ea  15x ea  2x15 ea 30x ea dir b/l  NP-resumb NV    Ankle AROM DF/PF/EV/IV  20x ea direction HEP        Active HS stretch w/ ankle pumps  10 pumps x3 10 pumps x3 10 pumps x10 10 pumps x3 10 pumps x 3 10 pumps x 3 10 pumps x 3   NEURO RE-ED           Towel curls    10x  20x + toe ext 20x + toe ext   30x +toe ext 30x + toe ext     Neutral Ankles    5" x10 10"x10 10"x10   10"x10 10"x10 10"x10    Seated BAPS    20x a/p, 20x m/l  20x a/p and m/l  20x a/p and m/l 30x a/p and m/l 30 a/p and m/l  30x a/p and m/l    clamshells          10"x10 10"x10     Weight shifts in //bars m/l, a/p            10"x10/10"x10 15x/15x on HUMAC   Hamstring curls             15x   Marches                Hip 3-way flex, abd, ext             15x ea L only w/cues                    Gait Training                 gait training //bars and w/ crutches             8' step to and step through 8' step through gait pattern                      Modalities 2/16           CP 10' elevated

## 2021-03-22 ENCOUNTER — OFFICE VISIT (OUTPATIENT)
Dept: PHYSICAL THERAPY | Facility: CLINIC | Age: 36
End: 2021-03-22
Payer: COMMERCIAL

## 2021-03-22 DIAGNOSIS — S82.852D CLOSED TRIMALLEOLAR FRACTURE OF LEFT ANKLE WITH ROUTINE HEALING, SUBSEQUENT ENCOUNTER: Primary | ICD-10-CM

## 2021-03-22 PROCEDURE — 97140 MANUAL THERAPY 1/> REGIONS: CPT

## 2021-03-22 PROCEDURE — 97112 NEUROMUSCULAR REEDUCATION: CPT

## 2021-03-22 PROCEDURE — 97110 THERAPEUTIC EXERCISES: CPT

## 2021-03-22 PROCEDURE — 97116 GAIT TRAINING THERAPY: CPT

## 2021-03-22 NOTE — PROGRESS NOTES
Daily Note     Today's date: 3/22/2021  Patient name: Zainab Welch  : 1985  MRN: [id number]  Referring provider: Kevin Goldsmith MD  Dx:   Encounter Diagnosis     ICD-10-CM    1  Closed trimalleolar fracture of left ankle with routine healing, subsequent encounter  [id number]                   Subjective: Pt reports no pain in L ankle  She has been able to ascend/descend stairs on L LE  Objective: See treatment diary below      Assessment: Initiated single crutch gait training, able to tolerate approx 10 steps before experiencing an increase in heel pain as plantar fascia insertion but would subside with rest, pt educated not to go through pain  Pt able to perform b/l standing hip exercises w/o pain  Continue with single crutch gait NV  Plan: Continue with current POC to address pt deficits        Daily Treatment Diary    EPOC: 3/30/2021  Precautions: NWB  Co- Morbidities:   Patient Active Problem List   Diagnosis    Closed trimalleolar fracture of left ankle    Class 1 obesity in adult    Status post open reduction with internal fixation (ORIF) of fracture of ankle         Manual 3/22  2/25  3/1  3/4  3/8 3/11 3/15 3/18   Consider TPR/STM to gastroc/soleus ant tib TB  TB 16'  RB  TB  TB RB TB TB    consider k-tape               Total Time 8'  16'  14'  14'  12' 12' 8' 8'     Exercise Diary 3/22  2/25 3/1  3/4  3/8 3/11 3/15 3/18   THEREX           Pt education/HEP instruct/handout           L ankle PROM 8' + great toe  10' 10' 10' 10' 8' + great toe 8' +great toe   Recumbent Bike w/ exag ankle AROM 5'  NV  NV  trialed pain 2'  NV 5' 5' 5'   Gastroc/ Soleus Stretch- long sit   30"x3  30"x3  30"x3   30"x3 30"x3 NP-resume NV    Seated HR/TR   15x HR only  15x ea  15x ea  2x15 ea 30x ea dir b/l  NP-resumb NV    Ankle AROM DF/PF/EV/IV  20x ea direction HEP        Active HS stretch w/ ankle pumps 10 pumps x3 10 pumps x3 10 pumps x3 10 pumps x10 10 pumps x3 10 pumps x 3 10 pumps x 3 10 pumps x 3 NEURO RE-ED           Towel curls   10x  20x + toe ext 20x + toe ext   30x +toe ext 30x + toe ext     Neutral Ankles   5" x10  10"x10 10"x10   10"x10 10"x10 10"x10    Seated BAPS 30x a/p, m/l  20x a/p, 20x m/l  20x a/p and m/l  20x a/p and m/l 30x a/p and m/l 30 a/p and m/l  30x a/p and m/l    clamshells         10"x10 10"x10     Weight shifts in //bars m/l, a/p HUMAC 10"x10/10"x10          10"x10/10"x10 15x/15x on HUMAC   Hamstring curls 15x ea b/l           15x   Marches 15x ea b/l              Hip 3-way flex, abd, ext abduction 15x ea b/l           15x ea L only w/cues                   Gait Training                gait training //bars and w/ crutches  Single crutch step to, b/l crutch step through 8'          8' step to and step through 8' step through gait pattern                     Modalities 2/16           CP 10' elevated

## 2021-03-25 ENCOUNTER — OFFICE VISIT (OUTPATIENT)
Dept: PHYSICAL THERAPY | Facility: CLINIC | Age: 36
End: 2021-03-25
Payer: COMMERCIAL

## 2021-03-25 DIAGNOSIS — S82.852D CLOSED TRIMALLEOLAR FRACTURE OF LEFT ANKLE WITH ROUTINE HEALING, SUBSEQUENT ENCOUNTER: Primary | ICD-10-CM

## 2021-03-25 PROCEDURE — 97116 GAIT TRAINING THERAPY: CPT | Performed by: PHYSICAL THERAPIST

## 2021-03-25 PROCEDURE — 97112 NEUROMUSCULAR REEDUCATION: CPT | Performed by: PHYSICAL THERAPIST

## 2021-03-25 PROCEDURE — 97110 THERAPEUTIC EXERCISES: CPT | Performed by: PHYSICAL THERAPIST

## 2021-03-25 PROCEDURE — 97140 MANUAL THERAPY 1/> REGIONS: CPT | Performed by: PHYSICAL THERAPIST

## 2021-03-25 NOTE — PROGRESS NOTES
Daily Note     Today's date: 3/25/2021  Patient name: Dany Balderas  : 1985  MRN: [id number]  Referring provider: Katiana Mata MD  Dx:   Encounter Diagnosis     ICD-10-CM    1  Closed trimalleolar fracture of left ankle with routine healing, subsequent encounter  [id number]                   Subjective: Pt reports doing well- states that she can ambulate around her home w/o crutches wearing CAM boot  Pt brought sneaker today to practice transition to shoe  Objective: See treatment diary below      Assessment: Pt able to tolerate ambulation in sneaker w/ b/l axillary crutches this visit, but pain in anterior ankle if allowing too much weight to shift to L  Pt reports heel pain not present when ambulating in sneaker today  Practiced initiation of gait in // bars focusing on heel --> toe rollover  Pt will benefit from continued practice  Consider k-tape NV for pain/comfort  Plan: Continue with current POC to address pt deficits        Daily Treatment Diary    EPOC: 3/30/2021  Precautions: PWB  Co- Morbidities:   Patient Active Problem List   Diagnosis    Closed trimalleolar fracture of left ankle    Class 1 obesity in adult    Status post open reduction with internal fixation (ORIF) of fracture of ankle         Manual 3/22 3/25           TPR/STM to gastroc/soleus ant tib TB RB           TCJ mobs  RB            consider k-tape  NV              Total Time 8' 12'             Exercise Diary 3/22 3/25         THEREX           Pt education/HEP instruct/handout           L ankle PROM 8' + great toe          Recumbent Bike w/ exag ankle AROM 5' 5'         Gastroc/ Soleus Stretch- long sit  30"x3 seated in chair/30"x3 long sit         Seated HR/TR  30x ea         Active HS stretch w/ ankle pumps 10 pumps x3 NP         NEURO RE-ED           Neutral Ankles           Seated BAPS 30x a/p, m/l 30x ea dir a/p/ml         clamshells           Weight shifts in //bars m/l, a/p HUMAC 10"x10/10"x10 10"x10 a/p and m/l (HUMAC NV)         Hamstring curls 15x ea b/l NP         Marches 15x ea b/l NP         Hip 3-way flex, abd, ext abduction 15x ea b/l NP         TB resisted ankle strength  NV          Gait Training            gait training //bars and w/ crutches  Single crutch step to, b/l crutch step through 8' B/l axillary crutches in sneaker 5'         Initiation of gait - heel to toe rollover in // bars  5'           Modalities 2/16           CP 10' elevated

## 2021-03-28 DIAGNOSIS — Z98.890 STATUS POST OPEN REDUCTION WITH INTERNAL FIXATION (ORIF) OF FRACTURE OF ANKLE: Primary | ICD-10-CM

## 2021-03-28 DIAGNOSIS — Z87.81 STATUS POST OPEN REDUCTION WITH INTERNAL FIXATION (ORIF) OF FRACTURE OF ANKLE: Primary | ICD-10-CM

## 2021-03-29 ENCOUNTER — OFFICE VISIT (OUTPATIENT)
Dept: PHYSICAL THERAPY | Facility: CLINIC | Age: 36
End: 2021-03-29
Payer: COMMERCIAL

## 2021-03-29 DIAGNOSIS — S82.852D CLOSED TRIMALLEOLAR FRACTURE OF LEFT ANKLE WITH ROUTINE HEALING, SUBSEQUENT ENCOUNTER: Primary | ICD-10-CM

## 2021-03-29 PROCEDURE — 97140 MANUAL THERAPY 1/> REGIONS: CPT

## 2021-03-29 PROCEDURE — 97112 NEUROMUSCULAR REEDUCATION: CPT

## 2021-03-29 PROCEDURE — 97110 THERAPEUTIC EXERCISES: CPT

## 2021-03-29 PROCEDURE — 97116 GAIT TRAINING THERAPY: CPT

## 2021-03-29 NOTE — PROGRESS NOTES
Daily Note     Today's date: 3/29/2021  Patient name: Lurdes Tavera  : 1985  MRN: [id number]  Referring provider: Oscar Cuenca MD  Dx:   Encounter Diagnosis     ICD-10-CM    1  Closed trimalleolar fracture of left ankle with routine healing, subsequent encounter  Q42 [id number]                   Subjective: Pt reports having pain to anterior ankle with weight bearing in sneaker, has not attempted since last therapy visit  She reports mild pain in CAM boot  Objective: See treatment diary below      Assessment: Less pain present in L ankle with kinesiotape, cues to increase heel strike and glute contraction which resulted in less antalgic gait pattern  Some discomfort present with tband ankle inversion but pt reported in tolerable  ROM improving  Plan: Continue with current POC to address pt deficits        Daily Treatment Diary    EPOC: 3/30/2021  Precautions: PWB  Co- Morbidities:   Patient Active Problem List   Diagnosis    Closed trimalleolar fracture of left ankle    Class 1 obesity in adult    Status post open reduction with internal fixation (ORIF) of fracture of ankle         Manual 3/22 3/25 3/29          TPR/STM to gastroc/soleus ant tib TB RB TB          TCJ mobs  RB NP           consider k-tape  NV  TB I strip to achilles +stirrup            Total Time 8' 12' 14'            Exercise Diary 3/22 3/25 3/29        THEREX           Pt education/HEP instruct/handout           L ankle PROM 8' + great toe  5'        Recumbent Bike w/ exag ankle AROM 5' 5' 5'        Gastroc/ Soleus Stretch- long sit  30"x3 seated in chair/30"x3 long sit 30"x3 bent knee, 30"x3 straight leg        Seated HR/TR  30x ea NP-resume NV        Active HS stretch w/ ankle pumps 10 pumps x3 NP         NEURO RE-ED           Neutral Ankles           Seated BAPS 30x a/p, m/l 30x ea dir a/p/ml 30x ea dir a/p, m/l        clamshells           Weight shifts in //bars m/l, a/p HUMAC 10"x10/10"x10 10"x10 a/p and m/l (HUMAC NV) 10"x10 a/l 10"x10 m/l HUMAC        Hamstring curls 15x ea b/l NP         Marches 15x ea b/l NP         Hip 3-way flex, abd, ext abduction 15x ea b/l NP         TB resisted ankle strength  NV 15x ea dir RTB         Gait Training            gait training //bars and w/ crutches  Single crutch step to, b/l crutch step through 8' B/l axillary crutches in sneaker 5' B/l axillary crutches in sneaker 5'        Initiation of gait - heel to toe rollover in // bars  5' 5'          Modalities 2/16           CP 10' elevated

## 2021-03-31 DIAGNOSIS — Z23 ENCOUNTER FOR IMMUNIZATION: ICD-10-CM

## 2021-04-01 ENCOUNTER — APPOINTMENT (OUTPATIENT)
Dept: PHYSICAL THERAPY | Facility: CLINIC | Age: 36
End: 2021-04-01
Payer: COMMERCIAL

## 2021-04-02 ENCOUNTER — OFFICE VISIT (OUTPATIENT)
Dept: OBGYN CLINIC | Facility: CLINIC | Age: 36
End: 2021-04-02

## 2021-04-02 ENCOUNTER — APPOINTMENT (OUTPATIENT)
Dept: RADIOLOGY | Facility: CLINIC | Age: 36
End: 2021-04-02
Payer: COMMERCIAL

## 2021-04-02 VITALS
HEART RATE: 79 BPM | DIASTOLIC BLOOD PRESSURE: 73 MMHG | HEIGHT: 67 IN | SYSTOLIC BLOOD PRESSURE: 109 MMHG | WEIGHT: 214 LBS | BODY MASS INDEX: 33.59 KG/M2

## 2021-04-02 DIAGNOSIS — Z98.890 STATUS POST OPEN REDUCTION WITH INTERNAL FIXATION (ORIF) OF FRACTURE OF ANKLE: Primary | ICD-10-CM

## 2021-04-02 DIAGNOSIS — Z87.81 STATUS POST OPEN REDUCTION WITH INTERNAL FIXATION (ORIF) OF FRACTURE OF ANKLE: Primary | ICD-10-CM

## 2021-04-02 DIAGNOSIS — Z87.81 STATUS POST OPEN REDUCTION WITH INTERNAL FIXATION (ORIF) OF FRACTURE OF ANKLE: ICD-10-CM

## 2021-04-02 DIAGNOSIS — Z98.890 STATUS POST OPEN REDUCTION WITH INTERNAL FIXATION (ORIF) OF FRACTURE OF ANKLE: ICD-10-CM

## 2021-04-02 PROCEDURE — 73610 X-RAY EXAM OF ANKLE: CPT

## 2021-04-02 PROCEDURE — 99024 POSTOP FOLLOW-UP VISIT: CPT | Performed by: ORTHOPAEDIC SURGERY

## 2021-04-02 NOTE — LETTER
April 2, 2021     Patient: Tamie Whatley   YOB: 1985   Date of Visit: 4/2/2021       To Whom it May Concern:    Tamie Whatley is under my professional care  She was seen in my office on 4/2/2021  She can return to work 4/26/21 normal work duties  If you have any questions or concerns, please don't hesitate to call           Sincerely,          Tavo Vila MD        CC: No Recipients

## 2021-04-02 NOTE — PROGRESS NOTES
Orthopaedics Office Visit - Post-op Patient Visit    ASSESSMENT/PLAN:    Assessment:   10 weeks s/p ORIF left ankle trimalleolar fracture overall doing well     Plan:     Imaging reviewed today with patient  She can wean out of boot into well supportive sneaker and WBAT  She will continue with physical therapy advancing motion, strengthening, gait  See back in 3 months with imaging   Return to normal work duties 4/26/21, letter provided today    To Do at next visit:  L ankle XR, exam  _____________________________________________________  CHIEF COMPLAINT:  No chief complaint on file  SUBJECTIVE:  Christina Darby is a 28 y o  female who presents 10 weeks s/p ORIF left ankle trimalleolar fracture  She presents in CAM boot WBAT with single crutch  She has no pain while walking in boot  She is attending physical therapy which is going well  Denies any numbness or tingling  SOCIAL HISTORY:  Social History     Tobacco Use    Smoking status: Never Smoker    Smokeless tobacco: Never Used   Substance Use Topics    Alcohol use: Not Currently    Drug use: Never       MEDICATIONS:    Current Outpatient Medications:     acetaminophen (TYLENOL) 325 mg tablet, Take 650 mg by mouth every 6 (six) hours as needed for mild pain, Disp: , Rfl:     aspirin (ECOTRIN LOW STRENGTH) 81 mg EC tablet, Take 81 mg by mouth daily, Disp: , Rfl:     cyclobenzaprine (FLEXERIL) 10 mg tablet, Take 1 tablet (10 mg total) by mouth 2 (two) times a day as needed for muscle spasms (Patient not taking: Reported on 2/9/2021), Disp: 20 tablet, Rfl: 0    naloxone (NARCAN) 4 mg/0 1 mL nasal spray, Administer 1 spray into a nostril  If no response after 2-3 minutes, give another dose in the other nostril using a new spray   (Patient not taking: Reported on 2/9/2021), Disp: 1 each, Rfl: 1    naproxen (NAPROSYN) 500 mg tablet, Take 1 tablet (500 mg total) by mouth 2 (two) times a day with meals (Patient not taking: Reported on 2/9/2021), Disp: 20 tablet, Rfl: 0    oxyCODONE (ROXICODONE) 5 mg immediate release tablet, Take 1 tablet (5 mg total) by mouth every 4 (four) hours as needed for moderate painMax Daily Amount: 30 mg (Patient not taking: Reported on 2/9/2021), Disp: 30 tablet, Rfl: 0    oxyCODONE (ROXICODONE) 5 mg immediate release tablet, Take 1 tablet (5 mg total) by mouth every 6 (six) hours as needed for moderate painMax Daily Amount: 20 mg (Patient not taking: Reported on 2/9/2021), Disp: 30 tablet, Rfl: 0    REVIEW OF SYSTEMS:  MSK: left ankle pain  Neuro: negative   Pertinent items are otherwise noted in HPI  A comprehensive review of systems was otherwise negative     _____________________________________________________  PHYSICAL EXAMINATION:  Vital signs: There were no vitals taken for this visit  General: No acute distress, awake and alert  Psychiatric: Mood and affect appear appropriate  HEENT: Trachea Midline, No torticollis, no apparent facial trauma  Cardiovascular: No audible murmurs;  Extremities appear perfused  Pulmonary: No audible wheezing or stridor  Skin: No open lesions; see further details (if any) below    MUSCULOSKELETAL EXAMINATION:  Extremities:  Left ankle   No erythema, mild swelling lateral ankle, well healed incision  No bony tenderness   Painless AROM of ankle  Calf is supple   Foot and toes warm and perfuse       _____________________________________________________  STUDIES REVIEWED:  I personally reviewed the images and interpretation is as follows:  XR left ankle demonstrated stable alignment and hardware fixation s/p ORIF trimalleolar fracture , fracture healed    PROCEDURES PERFORMED:  Procedures    Patrice Holloway         Scribe Attestation    I,:  Patrice Holloway am acting as a scribe while in the presence of the attending physician :       I,:  Farshad Molina MD personally performed the services described in this documentation    as scribed in my presence :

## 2021-04-05 ENCOUNTER — OFFICE VISIT (OUTPATIENT)
Dept: PHYSICAL THERAPY | Facility: CLINIC | Age: 36
End: 2021-04-05
Payer: COMMERCIAL

## 2021-04-05 DIAGNOSIS — S82.852D CLOSED TRIMALLEOLAR FRACTURE OF LEFT ANKLE WITH ROUTINE HEALING, SUBSEQUENT ENCOUNTER: Primary | ICD-10-CM

## 2021-04-05 DIAGNOSIS — Z87.81 STATUS POST OPEN REDUCTION WITH INTERNAL FIXATION (ORIF) OF FRACTURE OF ANKLE: ICD-10-CM

## 2021-04-05 DIAGNOSIS — Z98.890 STATUS POST OPEN REDUCTION WITH INTERNAL FIXATION (ORIF) OF FRACTURE OF ANKLE: ICD-10-CM

## 2021-04-05 PROCEDURE — 97140 MANUAL THERAPY 1/> REGIONS: CPT

## 2021-04-05 PROCEDURE — 97112 NEUROMUSCULAR REEDUCATION: CPT

## 2021-04-05 PROCEDURE — 97110 THERAPEUTIC EXERCISES: CPT

## 2021-04-05 NOTE — PROGRESS NOTES
Daily Note     Today's date: 2021  Patient name: Will Traylor  : 1985  MRN: [id number]  Referring provider: Adrian Medina MD  Dx:   Encounter Diagnosis     ICD-10-CM    1  Closed trimalleolar fracture of left ankle with routine healing, subsequent encounter  S82 882D    2  Status post open reduction with internal fixation (ORIF) of fracture of ankle  Z98 890 Ambulatory referral to Physical Therapy    Z87 81                   Subjective: Pt ambulates into clinic without CAM boot and no crutches, reports L ankle swelling significantly yesterday after being on her feet more  Ortho D/C CAM boot, reports everything healed  Objective: See treatment diary below      Assessment: Added a single crutch as pt reported increased pain and presented with antalgic gait w/o crutches; good tolerance and better technique; educated on activity modification  Improvement in tissue quality/to to gastro soleus post manuals  Plan: Continue with current POC to address pt deficits  Review steps w/single crutch NV        Daily Treatment Diary    EPOC: 3/30/2021  Precautions: PWB  Co- Morbidities:   Patient Active Problem List   Diagnosis    Closed trimalleolar fracture of left ankle    Class 1 obesity in adult    Status post open reduction with internal fixation (ORIF) of fracture of ankle         Manual 3/22 3/25 3/29 4/5         TPR/STM to gastroc/soleus ant tib TB RB TB TB         TCJ mobs  RB NP NP          consider k-tape  NV  TB I strip to achilles +stirrup  TB-I strip to achilles +stirrup          Total Time 8' 12' 14' 10'           Exercise Diary 3/22 3/25 3/29 4/5       THEREX           Pt education/HEP instruct/handout           L ankle PROM 8' + great toe  5' 5'       Recumbent Bike w/ exag ankle AROM 5' 5' 5' 5'       Gastroc/ Soleus Stretch- long sit  30"x3 seated in chair/30"x3 long sit 30"x3 bent knee, 30"x3 straight leg 30"x3 bent knee, 30"x3 straight leg       Seated HR/TR  30x ea NP-resume NV Active HS stretch w/ ankle pumps 10 pumps x3 NP         NEURO RE-ED           Neutral Ankles           Seated BAPS 30x a/p, m/l 30x ea dir a/p/ml 30x ea dir a/p, m/l 30x ea dir a/p, m/l       clamshells           Weight shifts in //bars m/l, a/p HUMAC 10"x10/10"x10 10"x10 a/p and m/l (HUMAC NV) 10"x10 a/l 10"x10 m/l HUMAC 10"x10 a/l 10"x10 m/l HUMAC       Hamstring curls 15x ea b/l NP         Marches 15x ea b/l NP         Hip 3-way flex, abd, ext abduction 15x ea b/l NP         TB resisted ankle strength  NV 15x ea dir RTB 15x ea dir RTB        Gait Training            gait training //bars and w/ crutches  Single crutch step to, b/l crutch step through 8' B/l axillary crutches in sneaker 5' B/l axillary crutches in sneaker 5' Single crutch in sneaker 8'       Initiation of gait - heel to toe rollover in // bars  5' 5'          Modalities 2/16           CP 10' elevated

## 2021-04-08 ENCOUNTER — OFFICE VISIT (OUTPATIENT)
Dept: PHYSICAL THERAPY | Facility: CLINIC | Age: 36
End: 2021-04-08
Payer: COMMERCIAL

## 2021-04-08 DIAGNOSIS — S82.852D CLOSED TRIMALLEOLAR FRACTURE OF LEFT ANKLE WITH ROUTINE HEALING, SUBSEQUENT ENCOUNTER: ICD-10-CM

## 2021-04-08 DIAGNOSIS — Z98.890 STATUS POST OPEN REDUCTION WITH INTERNAL FIXATION (ORIF) OF FRACTURE OF ANKLE: Primary | ICD-10-CM

## 2021-04-08 DIAGNOSIS — Z87.81 STATUS POST OPEN REDUCTION WITH INTERNAL FIXATION (ORIF) OF FRACTURE OF ANKLE: Primary | ICD-10-CM

## 2021-04-08 PROCEDURE — 97140 MANUAL THERAPY 1/> REGIONS: CPT | Performed by: PHYSICAL THERAPIST

## 2021-04-08 PROCEDURE — 97110 THERAPEUTIC EXERCISES: CPT | Performed by: PHYSICAL THERAPIST

## 2021-04-08 PROCEDURE — 97112 NEUROMUSCULAR REEDUCATION: CPT | Performed by: PHYSICAL THERAPIST

## 2021-04-08 NOTE — PROGRESS NOTES
Daily Note     Today's date: 2021  Patient name: Unice Cogan  : 1985   MRN: [id number]  Referring provider: Rommel David MD  Dx:   Encounter Diagnosis     ICD-10-CM    1  Status post open reduction with internal fixation (ORIF) of fracture of ankle  Z98 890     Z87 81    2  Closed trimalleolar fracture of left ankle with routine healing, subsequent encounter  C75 280A                   Subjective: Ellen reports no changes from previous session  She reports that her stretches feel good  Objective: See treatment diary below      Assessment: Tolerated treatment well  Patient demonstrated fatigue post treatment and would benefit from continued PT  Continued with current treatment plan  Patient provided verbal and tactile cues to avoid compensations, especially with BAPS activity  Plan: Continue per plan of care  Progress treatment as tolerated         Daily Treatment Diary    EPOC: 3/30/2021  Precautions: PWB  Co- Morbidities:   Patient Active Problem List   Diagnosis    Closed trimalleolar fracture of left ankle    Class 1 obesity in adult    Status post open reduction with internal fixation (ORIF) of fracture of ankle         Manual 3/22 3/25 3/29 4/5 4/8        TPR/STM to gastroc/soleus ant tib TB RB TB TB         TCJ mobs  RB NP NP EG         consider k-tape  NV  TB I strip to achilles +stirrup  TB-I strip to achilles +stirrup          Total Time 8' 12' 14' 10'           Exercise Diary 3/22 3/25 3/29 4/5 4/8      THEREX           Pt education/HEP instruct/handout           L ankle PROM 8' + great toe  5' 5'       Recumbent Bike w/ exag ankle AROM 5' 5' 5' 5' 5'      Gastroc/ Soleus Stretch- long sit  30"x3 seated in chair/30"x3 long sit 30"x3 bent knee, 30"x3 straight leg 30"x3 bent knee, 30"x3 straight leg 3x30" each      Seated HR/TR  30x ea NP-resume NV  30x each      Active HS stretch w/ ankle pumps 10 pumps x3 NP         NEURO RE-ED           Neutral Ankles           Seated BAPS 30x a/p, m/l 30x ea dir a/p/ml 30x ea dir a/p, m/l 30x ea dir a/p, m/l 30x ea dir a/p, m/l      clamshells           Weight shifts in //bars m/l, a/p HUMAC 10"x10/10"x10 10"x10 a/p and m/l (HUMAC NV) 10"x10 a/l 10"x10 m/l HUMAC 10"x10 a/l 10"x10 m/l HUMAC 10"x10 a/l 10"x10 m/l HUMAC      Hamstring curls 15x ea b/l NP         Marches 15x ea b/l NP         Hip 3-way flex, abd, ext abduction 15x ea b/l NP         TB resisted ankle strength  NV 15x ea dir RTB 15x ea dir RTB 15x ea dir RTB       Gait Training            gait training //bars and w/ crutches  Single crutch step to, b/l crutch step through 8' B/l axillary crutches in sneaker 5' B/l axillary crutches in sneaker 5' Single crutch in sneaker 8'       Initiation of gait - heel to toe rollover in // bars  5' 5'          Modalities 2/16           CP 10' elevated

## 2021-04-12 ENCOUNTER — EVALUATION (OUTPATIENT)
Dept: PHYSICAL THERAPY | Facility: CLINIC | Age: 36
End: 2021-04-12
Payer: COMMERCIAL

## 2021-04-12 DIAGNOSIS — Z87.81 STATUS POST OPEN REDUCTION WITH INTERNAL FIXATION (ORIF) OF FRACTURE OF ANKLE: Primary | ICD-10-CM

## 2021-04-12 DIAGNOSIS — S82.852D CLOSED TRIMALLEOLAR FRACTURE OF LEFT ANKLE WITH ROUTINE HEALING, SUBSEQUENT ENCOUNTER: ICD-10-CM

## 2021-04-12 DIAGNOSIS — Z98.890 STATUS POST OPEN REDUCTION WITH INTERNAL FIXATION (ORIF) OF FRACTURE OF ANKLE: Primary | ICD-10-CM

## 2021-04-12 PROCEDURE — 97110 THERAPEUTIC EXERCISES: CPT | Performed by: PHYSICAL THERAPIST

## 2021-04-12 PROCEDURE — 97140 MANUAL THERAPY 1/> REGIONS: CPT | Performed by: PHYSICAL THERAPIST

## 2021-04-12 NOTE — PROGRESS NOTES
PT Re-Evaluation     Today's date: 2021  Patient name: Will Traylor  : 1985  MRN: [id number]  Referring provider: Adrian Medina MD  Dx:   Encounter Diagnosis     ICD-10-CM    1  Status post open reduction with internal fixation (ORIF) of fracture of ankle  Z98 890     Z87 81    2  Closed trimalleolar fracture of left ankle with routine healing, subsequent encounter  S88 366R                   Assessment  Assessment details: Will Traylor is a 28 y o  female being treated as an outpatient to [facility] s/p L ankle trimalleolar fracture (2021) f/b ORIF (2021)  Since IE, pt has made gains in activity tolerance/function, ROM, strength, and gait, but continues to remain limited from max function  Pt will continue to benefit from skilled PT services in order to max function to allow pt to achieve goals in PT  Thank you  Impairments: abnormal gait, abnormal muscle tone, abnormal or restricted ROM, activity intolerance, impaired physical strength and pain with function  Understanding of Dx/Px/POC: good   Prognosis: good    Goals  ST  Pain decreased by 25% in 4-6 weeks -Partially Met (improved activity tolerance/function)  2  ROM increased by 25% in 4-6 weeks  - Partially Met    LT  Decrease pain to 1-2/10 at worst by d/c - Not Met  2  Increase ROM to [facility] for all deficient movements by d/c - Not Met  3  Strength increased to 5 for all deficient muscle groups by d/c - NOTMet  4  IADL performance increased to max function by d/c - Partially Met  5  Recreational performance increased to max function by d/c - Not Met  6   Ambulation/stair climbing improved to max level of function by d/c - Partially Met    Plan  Planned modality interventions: cryotherapy and TENS  Other planned modality interventions: other modalities PRN  Planned therapy interventions: abdominal trunk stabilization, ADL retraining, IADL retraining, flexibility, functional ROM exercises, gait training, graded exercise, home exercise program, manual therapy, joint mobilization, neuromuscular re-education, patient education, strengthening, therapeutic exercise, therapeutic activities and balance  Other planned therapy interventions: other interventions PRN  Frequency: 1-2x/week  Duration in weeks: 6  Plan of Care beginning date: 4/5/2021  Plan of Care expiration date: 5/17/2021  Treatment plan discussed with: patient        Subjective Evaluation    History of Present Illness  Date of onset: 1/17/2021  Date of surgery: 1/25/2021  Mechanism of injury: CURRENT LEVEL (4/12/2021)  Pt now ambulating in sneaker w/ u/l crutch  She is able to ambulate w/o AD, but hs been instructed to use AD for any long distances due to continued impaired gait to avoid compensatory injury  INITIAL LEVEL (2/16/2021)  Pt reports to IE wearing CAM boot and using b/l axillary crutches s/p ORIF for L trimalleolar fracture as a result of slip and fall on ice  Pt denies any medical complications w/ surgery  Pt reports intermittent numbness/parasthesias in toes  Pt reports taking ASA preventively for DVT since surgery  Pain  Current pain rating: 3  At worst pain rating: 3  Relieving factors: ice  Exacerbated by: excessive movement such as rolling over in bed, gravity dependent position for extended periods of time  Social Support  Steps to enter house: no (goes in through garage)  Stairs in house: yes (scoots on buttocks)   Lives in: multiple-level home  Lives with: cousins  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    Patient Goals  Patient goals for therapy: decreased pain (Partially Met (improved activity tolerance))  Patient goal: Resume walking (Partially Met)        Objective     Active Range of Motion   Left Ankle/Foot   Dorsiflexion (ke): 0 degrees   Dorsiflexion (kf): 0 degrees   Plantar flexion: 40 degrees   Inversion: 5 degrees   Eversion: 0 degrees     Passive Range of Motion   Left Ankle/Foot    Dorsiflexion (ke): 10 degrees with pain  Dorsiflexion (kf): 2 degrees with pain  Plantar flexion: 44 degrees with pain  Inversion: 10 degrees with pain  Eversion: 1 degrees     Strength/Myotome Testing     Left Ankle/Foot   Dorsiflexion: 4+  Plantar flexion: 4+  Inversion: 4- (*pain)  Eversion: 4+    General Comments: Ankle/Foot Comments   Palpation: Tenderness w/ palpation to anterior ankle joint, m/l ankle joint; hypertonicity of gastroc/soleus musculature; tenderness/hypertonicity w/ palpation to anterior tib musculature    Observation: 2 incisions- 1 lateral incision is 11 cm long and 1 medial incision is 8 cm long  Incisions healed well w/o s/s of infection    Gait: Pt ambulates w/ u/l axillary crutch in RUE w/ decreased heel --> toe rollover and increased WS to R- pt able to improve w/ adjusted crutch height, cueing to engage glutes, and mirror as visual cue   Pt able to ambulate w/o AD w/ increased lateral WS and decreased heel --> toe rollover            Daily Treatment Diary    EPOC: 5/17/2021  Precautions: PWB  Co- Morbidities:   Patient Active Problem List   Diagnosis    Closed trimalleolar fracture of left ankle    Class 1 obesity in adult    Status post open reduction with internal fixation (ORIF) of fracture of ankle         Manual 3/22 3/25 3/29 4/5 4/8 4/12       TPR/STM to gastroc/soleus ant tib TB RB TB TB  RB       TCJ mobs  RB NP NP EG RB        consider k-tape  NV  TB I strip to achilles +stirrup  TB-I strip to achilles +stirrup   NP- resume if needed       Total Time 8' 12' 14' 10'           Exercise Diary 3/22 3/25 3/29 4/5 4/8 4/12 (modified ex due to time constraints w/ progress note)     THEREX           Pt education/HEP instruct/handout           Progress Note      17'     L ankle PROM 8' + great toe  5' 5'  8'     Recumbent Bike w/ exag ankle AROM 5' 5' 5' 5' 5' 5'     Gastroc/ Soleus Stretch- long sit  30"x3 seated in chair/30"x3 long sit 30"x3 bent knee, 30"x3 straight leg 30"x3 bent knee, 30"x3 straight leg 3x30" each      Seated HR/TR  30x ea NP-resume NV  30x each      Active HS stretch w/ ankle pumps 10 pumps x3 NP         NEURO RE-ED           Neutral Ankles           Seated BAPS 30x a/p, m/l 30x ea dir a/p/ml 30x ea dir a/p, m/l 30x ea dir a/p, m/l 30x ea dir a/p, m/l      clamshells           Weight shifts in //bars m/l, a/p HUMAC 10"x10/10"x10 10"x10 a/p and m/l (HUMAC NV) 10"x10 a/l 10"x10 m/l HUMAC 10"x10 a/l 10"x10 m/l HUMAC 10"x10 a/l 10"x10 m/l HUMAC      Hamstring curls 15x ea b/l NP         Marches 15x ea b/l NP         Hip 3-way flex, abd, ext abduction 15x ea b/l NP         TB resisted ankle strength  NV 15x ea dir RTB 15x ea dir RTB 15x ea dir RTB       Gait Training           TM      1' attempted, but unble      gait training //bars and w/ crutches  Single crutch step to, b/l crutch step through 8' B/l axillary crutches in sneaker 5' B/l axillary crutches in sneaker 5' Single crutch in sneaker 8'  U/ crutch and no AD w/ glute emphasis 10'     Initiation of gait - heel to toe rollover in // bars  5' 5'   4'       Modalities 2/16           CP 10' elevated

## 2021-04-15 ENCOUNTER — OFFICE VISIT (OUTPATIENT)
Dept: PHYSICAL THERAPY | Facility: CLINIC | Age: 36
End: 2021-04-15
Payer: COMMERCIAL

## 2021-04-15 DIAGNOSIS — Z87.81 STATUS POST OPEN REDUCTION WITH INTERNAL FIXATION (ORIF) OF FRACTURE OF ANKLE: Primary | ICD-10-CM

## 2021-04-15 DIAGNOSIS — S82.852D CLOSED TRIMALLEOLAR FRACTURE OF LEFT ANKLE WITH ROUTINE HEALING, SUBSEQUENT ENCOUNTER: ICD-10-CM

## 2021-04-15 DIAGNOSIS — Z98.890 STATUS POST OPEN REDUCTION WITH INTERNAL FIXATION (ORIF) OF FRACTURE OF ANKLE: Primary | ICD-10-CM

## 2021-04-15 PROCEDURE — 97112 NEUROMUSCULAR REEDUCATION: CPT | Performed by: PHYSICAL THERAPIST

## 2021-04-15 PROCEDURE — 97110 THERAPEUTIC EXERCISES: CPT | Performed by: PHYSICAL THERAPIST

## 2021-04-15 NOTE — PROGRESS NOTES
Daily Note     Today's date: 4/15/2021  Patient name: So Verdin  : 1985  MRN: [id number]  Referring provider: Flora De Guzman MD  Dx:   Encounter Diagnosis     ICD-10-CM    1  Status post open reduction with internal fixation (ORIF) of fracture of ankle  Z98 890     Z87 81    2  Closed trimalleolar fracture of left ankle with routine healing, subsequent encounter  S83 854U                   Subjective: Pt reports that she has been practicing ambulation w/ glute engagement allowing for more proper gait  Objective: See treatment diary below      Assessment: Progressed WB'ing and stability exercises this visit  Pt able to perform step ups on 6" step w/ u/l rail leading w/ LLE but unable to perform step down even on 4" height  Attempted heel taps instead, but pt w/ significant pain  Added standing gastroc/DF stretch at wall and also instructed w/ pt on/against step  Pt w/ anterior ankle pain during this activity- instructed pt to push to the point of strong stretch/discomfort vs severe pain  Patient would benefit from continued PT      Plan: Continue per plan of care            Daily Treatment Diary    EPOC: 2021  Precautions: WBAT sneaker  Co- Morbidities:   Patient Active Problem List   Diagnosis    Closed trimalleolar fracture of left ankle    Class 1 obesity in adult    Status post open reduction with internal fixation (ORIF) of fracture of ankle         Manual 3/22 3/25 3/29 4/5 4/8 4/12 4/15      TPR/STM to gastroc/soleus ant tib TB RB TB TB  RB NP- resume nv      TCJ mobs  RB NP NP EG RB RB       consider k-tape  NV  TB I strip to achilles +stirrup  TB-I strip to achilles +stirrup   NP- resume if needed NP      Total Time 8' 12' 14' 10'   4'        Exercise Diary 3/25 3/29 4/5 4/8 4/12 (modified ex due to time constraints w/ progress note) 4/15    THEREX          Pt education/HEP instruct/handout          Progress Note     17'     L ankle PROM  5' 5'  8' 6'    Recumbent Bike w/ exag ankle AROM 5' 5' 5' 5' 5' 5'    Gastroc/ Soleus Stretch- long sit 30"x3 seated in chair/30"x3 long sit 30"x3 bent knee, 30"x3 straight leg 30"x3 bent knee, 30"x3 straight leg 3x30" each  Standing - gastroc 30"x3 wall + instruct on off step    Seated HR/TR 30x ea NP-resume NV  30x each      Active HS stretch w/ ankle pumps NP     10 pumps x 3    NEURO RE-ED          Neutral Ankles          Seated BAPS 30x ea dir a/p/ml 30x ea dir a/p, m/l 30x ea dir a/p, m/l 30x ea dir a/p, m/l      clamshells          Weight shifts in //bars m/l, a/p 10"x10 a/p and m/l (HUMAC NV) 10"x10 a/l 10"x10 m/l HUMAC 10"x10 a/l 10"x10 m/l HUMAC 10"x10 a/l 10"x10 m/l HUMAC 10"x10 a/p and 10"x10 m/l HUMAC 10"x10 a/p on step and 10"x10 m/l on HUMAC    Hip 3-way flex, abd, ext NP     RTB standing abd, flex- 15x ea dir    TB resisted ankle strength NV 15x ea dir RTB 15x ea dir RTB 15x ea dir RTB RTB 15x ea dir RTB 15x e dir    Mini squats      NV    Standing wobble board      NV     Gait Training          TM     1' attempted, but unble      gait training //bars and w/ crutches  B/l axillary crutches in sneaker 5' B/l axillary crutches in sneaker 5' Single crutch in sneaker 8'  U/ crutch and no AD w/ glute emphasis 10' U/l crutch and no AD w/ glute emphasis 5'    Initiation of gait - heel to toe rollover in // bars 5' 5'   4' NP- resume nv      Modalities 2/16           CP 10' elevated

## 2021-04-19 ENCOUNTER — OFFICE VISIT (OUTPATIENT)
Dept: PHYSICAL THERAPY | Facility: CLINIC | Age: 36
End: 2021-04-19
Payer: COMMERCIAL

## 2021-04-19 DIAGNOSIS — Z87.81 STATUS POST OPEN REDUCTION WITH INTERNAL FIXATION (ORIF) OF FRACTURE OF ANKLE: Primary | ICD-10-CM

## 2021-04-19 DIAGNOSIS — Z98.890 STATUS POST OPEN REDUCTION WITH INTERNAL FIXATION (ORIF) OF FRACTURE OF ANKLE: Primary | ICD-10-CM

## 2021-04-19 DIAGNOSIS — S82.852D CLOSED TRIMALLEOLAR FRACTURE OF LEFT ANKLE WITH ROUTINE HEALING, SUBSEQUENT ENCOUNTER: ICD-10-CM

## 2021-04-19 PROCEDURE — 97140 MANUAL THERAPY 1/> REGIONS: CPT

## 2021-04-19 PROCEDURE — 97112 NEUROMUSCULAR REEDUCATION: CPT

## 2021-04-19 PROCEDURE — 97110 THERAPEUTIC EXERCISES: CPT

## 2021-04-19 NOTE — PROGRESS NOTES
Daily Note     Today's date: 2021  Patient name: So Verdin  : 1985  MRN: [id number]  Referring provider: Flora De Guzman MD  Dx:   Encounter Diagnosis     ICD-10-CM    1  Status post open reduction with internal fixation (ORIF) of fracture of ankle  Z98 890     Z87 81    2  Closed trimalleolar fracture of left ankle with routine healing, subsequent encounter  S82 855I        Start Time: 1639  Stop Time: 1720  Total time in clinic (min): 41 minutes    Subjective: Pt reports pain/stiffness to L anterior ankle, had pain after LV but is unsure of why  Objective: See treatment diary below      Assessment: Pt was painful t/o tx session and session was modified accordingly  Pt educated to avoid wearing sandals/slides at this time as she is unable to heel strike appropriately and follow through  Improved tissue quality post manuals to L gastroc/soleus  Plan: Continue with current POC to address pt deficits          Daily Treatment Diary    EPOC: 2021  Precautions: WBAT sneaker  Co- Morbidities:   Patient Active Problem List   Diagnosis    Closed trimalleolar fracture of left ankle    Class 1 obesity in adult    Status post open reduction with internal fixation (ORIF) of fracture of ankle         Manual 3/22 3/25 3/29 4/5 4/8 4/12 4/15 4/19     TPR/STM to gastroc/soleus ant tib TB RB TB TB  RB NP- resume nv TB     TCJ mobs  RB NP NP EG RB RB NP      consider k-tape  NV  TB I strip to achilles +stirrup  TB-I strip to achilles +stirrup   NP- resume if needed NP NP     Total Time 8' 12' 14' 10'   4' 11'       Exercise Diary 3/25 3/29 4 (modified ex due to time constraints w/ progress note) 4/15 4/19   THEREX          Pt education/HEP instruct/handout          Progress Note     17'     L ankle PROM  5' 5'  8' 6' 7'   Recumbent Bike w/ exag ankle AROM 5' 5' 5' 5' 5' 5' 5'   Gastroc/ Soleus Stretch- long sit 30"x3 seated in chair/30"x3 long sit 30"x3 bent knee, 30"x3 straight leg 30"x3 bent knee, 30"x3 straight leg 3x30" each  Standing - gastroc 30"x3 wall + instruct on off step Off step 30"x3, pain with standing gastroc   Seated HR/TR 30x ea NP-resume NV  30x each      Active HS stretch w/ ankle pumps NP     10 pumps x 3 10 pumps x3   NEURO RE-ED          Neutral Ankles          Seated BAPS 30x ea dir a/p/ml 30x ea dir a/p, m/l 30x ea dir a/p, m/l 30x ea dir a/p, m/l      clamshells          Weight shifts in //bars m/l, a/p 10"x10 a/p and m/l (HUMAC NV) 10"x10 a/l 10"x10 m/l HUMAC 10"x10 a/l 10"x10 m/l HUMAC 10"x10 a/l 10"x10 m/l HUMAC 10"x10 a/p and 10"x10 m/l HUMAC 10"x10 a/p on step and 10"x10 m/l on HUMAC NP-resume NV   Hip 3-way flex, abd, ext NP     RTB standing abd, flex- 15x ea dir RTB standing L side only abd/flex/ext   TB resisted ankle strength NV 15x ea dir RTB 15x ea dir RTB 15x ea dir RTB RTB 15x ea dir RTB 15x e dir RTB 15x ea dir   Mini squats      NV trialed-pain 2'   Standing wobble board      NV NV    Gait Training          TM     1' attempted, but unble      gait training //bars and w/ crutches  B/l axillary crutches in sneaker 5' B/l axillary crutches in sneaker 5' Single crutch in sneaker 8'  U/ crutch and no AD w/ glute emphasis 10' U/l crutch and no AD w/ glute emphasis 5' U/l crutch, pain w/o AD this visit 5'   Initiation of gait - heel to toe rollover in // bars 5' 5'   4' NP- resume nv      Modalities 2/16           CP 10' elevated

## 2021-04-22 ENCOUNTER — APPOINTMENT (OUTPATIENT)
Dept: PHYSICAL THERAPY | Facility: CLINIC | Age: 36
End: 2021-04-22
Payer: COMMERCIAL

## 2021-04-26 ENCOUNTER — OFFICE VISIT (OUTPATIENT)
Dept: PHYSICAL THERAPY | Facility: CLINIC | Age: 36
End: 2021-04-26
Payer: COMMERCIAL

## 2021-04-26 DIAGNOSIS — S82.852D CLOSED TRIMALLEOLAR FRACTURE OF LEFT ANKLE WITH ROUTINE HEALING, SUBSEQUENT ENCOUNTER: ICD-10-CM

## 2021-04-26 DIAGNOSIS — Z98.890 STATUS POST OPEN REDUCTION WITH INTERNAL FIXATION (ORIF) OF FRACTURE OF ANKLE: Primary | ICD-10-CM

## 2021-04-26 DIAGNOSIS — Z87.81 STATUS POST OPEN REDUCTION WITH INTERNAL FIXATION (ORIF) OF FRACTURE OF ANKLE: Primary | ICD-10-CM

## 2021-04-26 PROCEDURE — 97110 THERAPEUTIC EXERCISES: CPT

## 2021-04-26 PROCEDURE — 97116 GAIT TRAINING THERAPY: CPT

## 2021-04-26 PROCEDURE — 97140 MANUAL THERAPY 1/> REGIONS: CPT

## 2021-04-26 PROCEDURE — 97112 NEUROMUSCULAR REEDUCATION: CPT

## 2021-04-26 NOTE — PROGRESS NOTES
Daily Note     Today's date: 2021  Patient name: Lev Miller  : 1985  MRN: [id number]  Referring provider: Andressa Estrella MD  Dx:   Encounter Diagnosis     ICD-10-CM    1  Status post open reduction with internal fixation (ORIF) of fracture of ankle  Z98 890     Z87 81    2  Closed trimalleolar fracture of left ankle with routine healing, subsequent encounter  P52 231Q                   Subjective: Pt went back to work today, reports "probably swollen" in reference to L ankle mild pain to L ankle  Objective: See treatment diary below      Assessment: Improved gait pattern compared to previous visits with continuous cues to increase stance time and glute engagement  Added DL wobbleboard to improve stability and step ups as pt reports going up sideways; cues for correct technique and no increases in pain  Progress as able in future visits  Plan: Continue with current POC to address pt deficits          Daily Treatment Diary    EPOC: 2021  Precautions: WBAT sneaker  Co- Morbidities:   Patient Active Problem List   Diagnosis    Closed trimalleolar fracture of left ankle    Class 1 obesity in adult    Status post open reduction with internal fixation (ORIF) of fracture of ankle         Manual 3/22 3/25 3/29 4/5 4/8 4/12 4/15 4/19 4/26    TPR/STM to gastroc/soleus ant tib TB RB TB TB  RB NP- resume nv TB TB    TCJ mobs  RB NP NP EG RB RB NP      consider k-tape  NV  TB I strip to achilles +stirrup  TB-I strip to achilles +stirrup   NP- resume if needed NP NP     Total Time 8' 12' 14' 10'   4' 11' 11'      Exercise Diary 3/25 3/29 4/5 4/8 4/12 (modified ex due to time constraints w/ progress note) 4/15 4/19 4/26    THEREX            Pt education/HEP instruct/handout            Progress Note     17'       L ankle PROM  5' 5'  8' 6' 7' 8'    Recumbent Bike w/ exag ankle AROM 5' 5' 5' 5' 5' 5' 5' 5'    Gastroc/ Soleus Stretch- long sit 30"x3 seated in chair/30"x3 long sit 30"x3 bent knee, 30"x3 straight leg 30"x3 bent knee, 30"x3 straight leg 3x30" each  Standing - gastroc 30"x3 wall + instruct on off step Off step 30"x3, pain with standing gastroc Off step 30"x3    Seated HR/TR 30x ea NP-resume NV  30x each        Active HS stretch w/ ankle pumps NP     10 pumps x 3 10 pumps x3 10 pump sx3    NEURO RE-ED            Neutral Ankles            Seated BAPS 30x ea dir a/p/ml 30x ea dir a/p, m/l 30x ea dir a/p, m/l 30x ea dir a/p, m/l        clamshells            Weight shifts in //bars m/l, a/p 10"x10 a/p and m/l (HUMAC NV) 10"x10 a/l 10"x10 m/l HUMAC 10"x10 a/l 10"x10 m/l HUMAC 10"x10 a/l 10"x10 m/l HUMAC 10"x10 a/p and 10"x10 m/l HUMAC 10"x10 a/p on step and 10"x10 m/l on HUMAC NP-resume NV     Hip 3-way flex, abd, ext NP     RTB standing abd, flex- 15x ea dir RTB standing L side only abd/flex/ext RTB standing 15x ea b/l flex/abd/ext    TB resisted ankle strength NV 15x ea dir RTB 15x ea dir RTB 15x ea dir RTB RTB 15x ea dir RTB 15x e dir RTB 15x ea dir     Mini squats      NV trialed-pain 2' 10"x10    Standing wobble board      NV NV 20x a/p 20x m/l     Gait Training            TM     1' attempted, but unble        gait training //bars and w/ crutches  B/l axillary crutches in sneaker 5' B/l axillary crutches in sneaker 5' Single crutch in sneaker 8'  U/ crutch and no AD w/ glute emphasis 10' U/l crutch and no AD w/ glute emphasis 5' U/l crutch, pain w/o AD this visit 5' No crutch 5' w/glute engagement    Step ups        6" 15x  fwd    Initiation of gait - heel to toe rollover in // bars 5' 5'   4' NP- resume nv        Modalities 2/16           CP 10' elevated

## 2021-04-29 ENCOUNTER — APPOINTMENT (OUTPATIENT)
Dept: PHYSICAL THERAPY | Facility: CLINIC | Age: 36
End: 2021-04-29
Payer: COMMERCIAL

## 2021-05-03 ENCOUNTER — APPOINTMENT (OUTPATIENT)
Dept: PHYSICAL THERAPY | Facility: CLINIC | Age: 36
End: 2021-05-03
Payer: COMMERCIAL

## 2021-05-03 NOTE — PROGRESS NOTES
Daily Note     Today's date: 5/3/2021  Patient name: Heena Domingo  : 1985  MRN: [id number]  Referring provider: Yamini Dixon MD  Dx: No diagnosis found  Subjective:       Objective: See treatment diary below      Assessment:       Plan: Continue per plan of care          Daily Treatment Diary    EPOC: 2021  Precautions: WBAT sneaker  Co- Morbidities:   Patient Active Problem List   Diagnosis    Closed trimalleolar fracture of left ankle    Class 1 obesity in adult    Status post open reduction with internal fixation (ORIF) of fracture of ankle         Manual 3/22 3/25 3/29 4/5 4/8 4/12 4/15 4/19 4/26 5/3   TPR/STM to gastroc/soleus ant tib TB RB TB TB  RB NP- resume nv TB TB    TCJ mobs  RB NP NP EG RB RB NP      consider k-tape  NV  TB I strip to achilles +stirrup  TB-I strip to achilles +stirrup   NP- resume if needed NP NP     Total Time 8' 12' 14' 10'   4' 11' 11'      Exercise Diary 3/25 3/29 4/5 4/8 4/12 (modified ex due to time constraints w/ progress note) 4/15 4/19 4/26    THEREX            Pt education/HEP instruct/handout            Progress Note     17'       L ankle PROM  5' 5'  8' 6' 7' 8'    Recumbent Bike w/ exag ankle AROM 5' 5' 5' 5' 5' 5' 5' 5'    Gastroc/ Soleus Stretch- long sit 30"x3 seated in chair/30"x3 long sit 30"x3 bent knee, 30"x3 straight leg 30"x3 bent knee, 30"x3 straight leg 3x30" each  Standing - gastroc 30"x3 wall + instruct on off step Off step 30"x3, pain with standing gastroc Off step 30"x3    Seated HR/TR 30x ea NP-resume NV  30x each        Active HS stretch w/ ankle pumps NP     10 pumps x 3 10 pumps x3 10 pump sx3    NEURO RE-ED            Neutral Ankles            Seated BAPS 30x ea dir a/p/ml 30x ea dir a/p, m/l 30x ea dir a/p, m/l 30x ea dir a/p, m/l        clamshells            Weight shifts in //bars m/l, a/p 10"x10 a/p and m/l (HUMAC NV) 10"x10 a/l 10"x10 m/l HUMAC 10"x10 a/l 10"x10 m/l HUMAC 10"x10 a/l 10"x10 m/l HUMAC 10"x10 a/p and 10"x10 m/l HUMAC 10"x10 a/p on step and 10"x10 m/l on HUMAC NP-resume NV     Hip 3-way flex, abd, ext NP     RTB standing abd, flex- 15x ea dir RTB standing L side only abd/flex/ext RTB standing 15x ea b/l flex/abd/ext    TB resisted ankle strength NV 15x ea dir RTB 15x ea dir RTB 15x ea dir RTB RTB 15x ea dir RTB 15x e dir RTB 15x ea dir     Mini squats      NV trialed-pain 2' 10"x10    Standing wobble board      NV NV 20x a/p 20x m/l     Gait Training            TM     1' attempted, but unble        gait training //bars and w/ crutches  B/l axillary crutches in sneaker 5' B/l axillary crutches in sneaker 5' Single crutch in sneaker 8'  U/ crutch and no AD w/ glute emphasis 10' U/l crutch and no AD w/ glute emphasis 5' U/l crutch, pain w/o AD this visit 5' No crutch 5' w/glute engagement    Step ups        6" 15x  fwd    Initiation of gait - heel to toe rollover in // bars 5' 5'   4' NP- resume nv        Modalities 2/16           CP 10' elevated

## 2021-05-06 ENCOUNTER — OFFICE VISIT (OUTPATIENT)
Dept: PHYSICAL THERAPY | Facility: CLINIC | Age: 36
End: 2021-05-06
Payer: COMMERCIAL

## 2021-05-06 DIAGNOSIS — S82.852D CLOSED TRIMALLEOLAR FRACTURE OF LEFT ANKLE WITH ROUTINE HEALING, SUBSEQUENT ENCOUNTER: ICD-10-CM

## 2021-05-06 DIAGNOSIS — Z98.890 STATUS POST OPEN REDUCTION WITH INTERNAL FIXATION (ORIF) OF FRACTURE OF ANKLE: Primary | ICD-10-CM

## 2021-05-06 DIAGNOSIS — Z87.81 STATUS POST OPEN REDUCTION WITH INTERNAL FIXATION (ORIF) OF FRACTURE OF ANKLE: Primary | ICD-10-CM

## 2021-05-06 PROCEDURE — 97110 THERAPEUTIC EXERCISES: CPT

## 2021-05-06 PROCEDURE — 97140 MANUAL THERAPY 1/> REGIONS: CPT

## 2021-05-06 PROCEDURE — 97112 NEUROMUSCULAR REEDUCATION: CPT

## 2021-05-06 NOTE — PROGRESS NOTES
Daily Note     Today's date: 2021  Patient name: Amairani Villegas  : 1985  MRN: [id number]  Referring provider: Yamini Hernandez MD  Dx:   Encounter Diagnosis     ICD-10-CM    1  Status post open reduction with internal fixation (ORIF) of fracture of ankle  Z98 890     Z87 81    2  Closed trimalleolar fracture of left ankle with routine healing, subsequent encounter  R92 507H                   Subjective: Pt reports mild pain to anterior aspect of L ankle  Objective: See treatment diary below      Assessment: Progressed pt program; tolerated new exercises well w/o increasing pain  Cues for glute engagement during gait helped to decrease lateral shift of pelvis  Trialed SL stance but pt reported pain therefore did not continue this visit  Plan: Continue with current POC to address pt deficits          Daily Treatment Diary    EPOC: 2021  Precautions: WBAT sneaker  Co- Morbidities:   Patient Active Problem List   Diagnosis    Closed trimalleolar fracture of left ankle    Class 1 obesity in adult    Status post open reduction with internal fixation (ORIF) of fracture of ankle         Manual 3/22 3/25 3/29 4/5 4/8 4/12 4/15 4/19 4/26 56   TPR/STM to gastroc/soleus ant tib TB RB TB TB  RB NP- resume nv TB TB TB   TCJ mobs  RB NP NP EG RB RB NP      consider k-tape  NV  TB I strip to achilles +stirrup  TB-I strip to achilles +stirrup   NP- resume if needed NP NP     Total Time 8' 12' 14' 10'   4' 11' 11' 8'     Exercise Diary 3/25 3/29 4 (modified ex due to time constraints w/ progress note) 4/15 4/19 4/26 56   THEREX            Pt education/HEP instruct/handout            Progress Note     17'       L ankle PROM  5' 5'  8' 6' 7' 8' 7'   Recumbent Bike w/ exag ankle AROM 5' 5' 5' 5' 5' 5' 5' 5' 5'   Gastroc/ Soleus Stretch- long sit 30"x3 seated in chair/30"x3 long sit 30"x3 bent knee, 30"x3 straight leg 30"x3 bent knee, 30"x3 straight leg 3x30" each  Standing - gastroc 30"x3 wall + instruct on off step Off step 30"x3, pain with standing gastroc Off step 30"x3 Off step 30"x3   Seated HR/TR 30x ea NP-resume NV  30x each        Active HS stretch w/ ankle pumps NP     10 pumps x 3 10 pumps x3 10 pump sx3 HEP   NEURO RE-ED            Neutral Ankles            Seated BAPS 30x ea dir a/p/ml 30x ea dir a/p, m/l 30x ea dir a/p, m/l 30x ea dir a/p, m/l        clamshells            Weight shifts in //bars m/l, a/p 10"x10 a/p and m/l (HUMAC NV) 10"x10 a/l 10"x10 m/l HUMAC 10"x10 a/l 10"x10 m/l HUMAC 10"x10 a/l 10"x10 m/l HUMAC 10"x10 a/p and 10"x10 m/l HUMAC 10"x10 a/p on step and 10"x10 m/l on HUMAC NP-resume NV     Hip 3-way flex, abd, ext NP     RTB standing abd, flex- 15x ea dir RTB standing L side only abd/flex/ext RTB standing 15x ea b/l flex/abd/ext RTB standing 15x ea b/l flex/abd/ext   TB resisted ankle strength NV 15x ea dir RTB 15x ea dir RTB 15x ea dir RTB RTB 15x ea dir RTB 15x e dir RTB 15x ea dir     Mini squats      NV trialed-pain 2' 10"x10 10"x10 TRX   Standing wobble board      NV NV 20x a/p 20x m/l 20x a/p, 20x a/l    Gait Training            TM     1' attempted, but unble        gait training //bars and w/ crutches  B/l axillary crutches in sneaker 5' B/l axillary crutches in sneaker 5' Single crutch in sneaker 8'  U/ crutch and no AD w/ glute emphasis 10' U/l crutch and no AD w/ glute emphasis 5' U/l crutch, pain w/o AD this visit 5' No crutch 5' w/glute engagement 4' glute engagment   Step ups        6" 15x  fwd 8" 15x fwd, 8" 15x lat   Initiation of gait - heel to toe rollover in // bars 5' 5'   4' NP- resume nv        Modalities 2/16           CP 10' elevated

## 2021-05-10 ENCOUNTER — APPOINTMENT (OUTPATIENT)
Dept: PHYSICAL THERAPY | Facility: CLINIC | Age: 36
End: 2021-05-10
Payer: COMMERCIAL

## 2021-05-13 ENCOUNTER — APPOINTMENT (OUTPATIENT)
Dept: PHYSICAL THERAPY | Facility: CLINIC | Age: 36
End: 2021-05-13
Payer: COMMERCIAL

## 2021-05-17 ENCOUNTER — APPOINTMENT (OUTPATIENT)
Dept: PHYSICAL THERAPY | Facility: CLINIC | Age: 36
End: 2021-05-17
Payer: COMMERCIAL

## 2021-05-20 ENCOUNTER — APPOINTMENT (OUTPATIENT)
Dept: PHYSICAL THERAPY | Facility: CLINIC | Age: 36
End: 2021-05-20
Payer: COMMERCIAL

## 2021-05-24 ENCOUNTER — APPOINTMENT (OUTPATIENT)
Dept: PHYSICAL THERAPY | Facility: CLINIC | Age: 36
End: 2021-05-24
Payer: COMMERCIAL

## 2021-05-27 ENCOUNTER — APPOINTMENT (OUTPATIENT)
Dept: PHYSICAL THERAPY | Facility: CLINIC | Age: 36
End: 2021-05-27
Payer: COMMERCIAL

## 2021-07-11 DIAGNOSIS — Z98.890 STATUS POST OPEN REDUCTION WITH INTERNAL FIXATION (ORIF) OF FRACTURE OF ANKLE: Primary | ICD-10-CM

## 2021-07-11 DIAGNOSIS — Z87.81 STATUS POST OPEN REDUCTION WITH INTERNAL FIXATION (ORIF) OF FRACTURE OF ANKLE: Primary | ICD-10-CM

## 2021-07-13 ENCOUNTER — OFFICE VISIT (OUTPATIENT)
Dept: OBGYN CLINIC | Facility: CLINIC | Age: 36
End: 2021-07-13
Payer: COMMERCIAL

## 2021-07-13 ENCOUNTER — APPOINTMENT (OUTPATIENT)
Dept: RADIOLOGY | Facility: CLINIC | Age: 36
End: 2021-07-13
Payer: COMMERCIAL

## 2021-07-13 VITALS
HEIGHT: 67 IN | BODY MASS INDEX: 34.21 KG/M2 | WEIGHT: 218 LBS | DIASTOLIC BLOOD PRESSURE: 74 MMHG | HEART RATE: 71 BPM | SYSTOLIC BLOOD PRESSURE: 112 MMHG

## 2021-07-13 DIAGNOSIS — S82.852D CLOSED TRIMALLEOLAR FRACTURE OF LEFT ANKLE WITH ROUTINE HEALING, SUBSEQUENT ENCOUNTER: ICD-10-CM

## 2021-07-13 DIAGNOSIS — Z87.81 STATUS POST OPEN REDUCTION WITH INTERNAL FIXATION (ORIF) OF FRACTURE OF ANKLE: ICD-10-CM

## 2021-07-13 DIAGNOSIS — Z98.890 STATUS POST OPEN REDUCTION WITH INTERNAL FIXATION (ORIF) OF FRACTURE OF ANKLE: Primary | ICD-10-CM

## 2021-07-13 DIAGNOSIS — Z87.81 STATUS POST OPEN REDUCTION WITH INTERNAL FIXATION (ORIF) OF FRACTURE OF ANKLE: Primary | ICD-10-CM

## 2021-07-13 DIAGNOSIS — Z98.890 STATUS POST OPEN REDUCTION WITH INTERNAL FIXATION (ORIF) OF FRACTURE OF ANKLE: ICD-10-CM

## 2021-07-13 PROCEDURE — 73610 X-RAY EXAM OF ANKLE: CPT

## 2021-07-13 PROCEDURE — 99213 OFFICE O/P EST LOW 20 MIN: CPT | Performed by: ORTHOPAEDIC SURGERY

## 2021-07-13 NOTE — PROGRESS NOTES
Orthopaedics Office Visit - Post-op Patient Visit    ASSESSMENT/PLAN:    Assessment:   s/p ORIF left ankle trimalleolar fracture on 1/25/21, doing well    Plan:     Imaging reviewed today with patient  Patient may continue with activities as tolerated  Explained to patient that mild aching and pain especially with colder weather rainy weather is likely from some mild arthritis from the fracture and injury  See back in 6 months with imaging       To Do at next visit:  L ankle XR, exam  _____________________________________________________  CHIEF COMPLAINT:  Chief Complaint   Patient presents with    Left Ankle - Follow-up         SUBJECTIVE:  Jessa Painting is a 28 y o  female who presents for follow up after 1/25/2 left ankle ORIF trimal fx  Patient reports intermittent aching pain in the posterior and lateral ankle  This is typically worse with cold rainy weather  Overall pain is much improved as compared to last visit  She is ambulating today with sandals on  She is not taking any medication for pain relief  She does note some mild intermittent swelling of the ankle  No numbness or tingling      SOCIAL HISTORY:  Social History     Tobacco Use    Smoking status: Never Smoker    Smokeless tobacco: Never Used   Vaping Use    Vaping Use: Never used   Substance Use Topics    Alcohol use: Not Currently    Drug use: Never       MEDICATIONS:    Current Outpatient Medications:     acetaminophen (TYLENOL) 325 mg tablet, Take 650 mg by mouth every 6 (six) hours as needed for mild pain (Patient not taking: Reported on 7/13/2021), Disp: , Rfl:     aspirin (ECOTRIN LOW STRENGTH) 81 mg EC tablet, Take 81 mg by mouth daily (Patient not taking: Reported on 7/13/2021), Disp: , Rfl:     cyclobenzaprine (FLEXERIL) 10 mg tablet, Take 1 tablet (10 mg total) by mouth 2 (two) times a day as needed for muscle spasms (Patient not taking: Reported on 4/2/2021), Disp: 20 tablet, Rfl: 0    naloxone (NARCAN) 4 mg/0 1 mL nasal spray, Administer 1 spray into a nostril  If no response after 2-3 minutes, give another dose in the other nostril using a new spray  (Patient not taking: Reported on 4/2/2021), Disp: 1 each, Rfl: 1    naproxen (NAPROSYN) 500 mg tablet, Take 1 tablet (500 mg total) by mouth 2 (two) times a day with meals (Patient not taking: Reported on 4/2/2021), Disp: 20 tablet, Rfl: 0    oxyCODONE (ROXICODONE) 5 mg immediate release tablet, Take 1 tablet (5 mg total) by mouth every 4 (four) hours as needed for moderate painMax Daily Amount: 30 mg (Patient not taking: Reported on 4/2/2021), Disp: 30 tablet, Rfl: 0    oxyCODONE (ROXICODONE) 5 mg immediate release tablet, Take 1 tablet (5 mg total) by mouth every 6 (six) hours as needed for moderate painMax Daily Amount: 20 mg (Patient not taking: Reported on 4/2/2021), Disp: 30 tablet, Rfl: 0    REVIEW OF SYSTEMS:  MSK: left ankle pain  Neuro: negative   Pertinent items are otherwise noted in HPI  A comprehensive review of systems was otherwise negative     _____________________________________________________  PHYSICAL EXAMINATION:  Vital signs: /74   Pulse 71   Ht 5' 7" (1 702 m)   Wt 98 9 kg (218 lb)   BMI 34 14 kg/m²   General: No acute distress, awake and alert  Psychiatric: Mood and affect appear appropriate  HEENT: Trachea Midline, No torticollis, no apparent facial trauma  Cardiovascular: No audible murmurs;  Extremities appear perfused  Pulmonary: No audible wheezing or stridor  Skin: No open lesions; see further details (if any) below    MUSCULOSKELETAL EXAMINATION:  Extremities:  Left ankle   No erythema, mild swelling lateral ankle, well healed incisions  No tenderness to palpation  Painless AROM of ankle, full motion, 5/5 strength in all planes  Foot and toes warm and perfused       _____________________________________________________  STUDIES REVIEWED:  I personally reviewed the images and interpretation is as follows:  XR left ankle demonstrated stable alignment and hardware fixation s/p ORIF trimalleolar fracture , fracture healed, mild degenerative changes    PROCEDURES PERFORMED:  Procedures    Vipin Marsh PA-C - assisting    Kassie Contreras MD

## 2021-08-07 ENCOUNTER — APPOINTMENT (EMERGENCY)
Dept: RADIOLOGY | Facility: HOSPITAL | Age: 36
End: 2021-08-07
Payer: COMMERCIAL

## 2021-08-07 ENCOUNTER — APPOINTMENT (EMERGENCY)
Dept: CT IMAGING | Facility: HOSPITAL | Age: 36
End: 2021-08-07
Payer: COMMERCIAL

## 2021-08-07 ENCOUNTER — HOSPITAL ENCOUNTER (EMERGENCY)
Facility: HOSPITAL | Age: 36
Discharge: HOME | End: 2021-08-07
Attending: EMERGENCY MEDICINE | Admitting: EMERGENCY MEDICINE
Payer: COMMERCIAL

## 2021-08-07 VITALS
WEIGHT: 218 LBS | SYSTOLIC BLOOD PRESSURE: 124 MMHG | TEMPERATURE: 98.1 F | OXYGEN SATURATION: 99 % | DIASTOLIC BLOOD PRESSURE: 62 MMHG | BODY MASS INDEX: 34.21 KG/M2 | HEART RATE: 78 BPM | RESPIRATION RATE: 18 BRPM | HEIGHT: 67 IN

## 2021-08-07 DIAGNOSIS — R10.9 FLANK PAIN: Primary | ICD-10-CM

## 2021-08-07 LAB
ALBUMIN SERPL BCP-MCNC: 3.6 G/DL (ref 3.5–5)
ALP SERPL-CCNC: 89 U/L (ref 46–116)
ALT SERPL W P-5'-P-CCNC: 15 U/L (ref 12–78)
ANION GAP SERPL CALCULATED.3IONS-SCNC: 10 MMOL/L (ref 4–13)
AST SERPL W P-5'-P-CCNC: 15 U/L (ref 5–45)
ATRIAL RATE: 61 BPM
BASOPHILS # BLD AUTO: 0.03 THOUSANDS/ΜL (ref 0–0.1)
BASOPHILS NFR BLD AUTO: 1 % (ref 0–1)
BILIRUB SERPL-MCNC: 0.25 MG/DL (ref 0.2–1)
BUN SERPL-MCNC: 15 MG/DL (ref 5–25)
CALCIUM SERPL-MCNC: 8.6 MG/DL (ref 8.3–10.1)
CHLORIDE SERPL-SCNC: 108 MMOL/L (ref 100–108)
CO2 SERPL-SCNC: 26 MMOL/L (ref 21–32)
CREAT SERPL-MCNC: 0.86 MG/DL (ref 0.6–1.3)
D DIMER PPP FEU-MCNC: 1.76 UG/ML FEU
EOSINOPHIL # BLD AUTO: 0.1 THOUSAND/ΜL (ref 0–0.61)
EOSINOPHIL NFR BLD AUTO: 2 % (ref 0–6)
ERYTHROCYTE [DISTWIDTH] IN BLOOD BY AUTOMATED COUNT: 13.6 % (ref 11.6–15.1)
GFR SERPL CREATININE-BSD FRML MDRD: 101 ML/MIN/1.73SQ M
GLUCOSE SERPL-MCNC: 86 MG/DL (ref 65–140)
HCG SERPL QL: NEGATIVE
HCT VFR BLD AUTO: 39.1 % (ref 34.8–46.1)
HGB BLD-MCNC: 12.6 G/DL (ref 11.5–15.4)
IMM GRANULOCYTES # BLD AUTO: 0.01 THOUSAND/UL (ref 0–0.2)
IMM GRANULOCYTES NFR BLD AUTO: 0 % (ref 0–2)
LYMPHOCYTES # BLD AUTO: 1.96 THOUSANDS/ΜL (ref 0.6–4.47)
LYMPHOCYTES NFR BLD AUTO: 38 % (ref 14–44)
MCH RBC QN AUTO: 26.5 PG (ref 26.8–34.3)
MCHC RBC AUTO-ENTMCNC: 32.2 G/DL (ref 31.4–37.4)
MCV RBC AUTO: 82 FL (ref 82–98)
MONOCYTES # BLD AUTO: 0.41 THOUSAND/ΜL (ref 0.17–1.22)
MONOCYTES NFR BLD AUTO: 8 % (ref 4–12)
NEUTROPHILS # BLD AUTO: 2.71 THOUSANDS/ΜL (ref 1.85–7.62)
NEUTS SEG NFR BLD AUTO: 51 % (ref 43–75)
NRBC BLD AUTO-RTO: 0 /100 WBCS
P AXIS: 52 DEGREES
PLATELET # BLD AUTO: 377 THOUSANDS/UL (ref 149–390)
PMV BLD AUTO: 10.7 FL (ref 8.9–12.7)
POTASSIUM SERPL-SCNC: 4.1 MMOL/L (ref 3.5–5.3)
PR INTERVAL: 188 MS
PROT SERPL-MCNC: 7.4 G/DL (ref 6.4–8.2)
QRS AXIS: 61 DEGREES
QRSD INTERVAL: 70 MS
QT INTERVAL: 388 MS
QTC INTERVAL: 390 MS
RBC # BLD AUTO: 4.75 MILLION/UL (ref 3.81–5.12)
SODIUM SERPL-SCNC: 144 MMOL/L (ref 136–145)
T WAVE AXIS: 43 DEGREES
TROPONIN I SERPL-MCNC: <0.02 NG/ML
VENTRICULAR RATE: 61 BPM
WBC # BLD AUTO: 5.22 THOUSAND/UL (ref 4.31–10.16)

## 2021-08-07 PROCEDURE — 84484 ASSAY OF TROPONIN QUANT: CPT | Performed by: EMERGENCY MEDICINE

## 2021-08-07 PROCEDURE — 84703 CHORIONIC GONADOTROPIN ASSAY: CPT | Performed by: EMERGENCY MEDICINE

## 2021-08-07 PROCEDURE — 99285 EMERGENCY DEPT VISIT HI MDM: CPT | Performed by: EMERGENCY MEDICINE

## 2021-08-07 PROCEDURE — 85025 COMPLETE CBC W/AUTO DIFF WBC: CPT | Performed by: EMERGENCY MEDICINE

## 2021-08-07 PROCEDURE — 80053 COMPREHEN METABOLIC PANEL: CPT | Performed by: EMERGENCY MEDICINE

## 2021-08-07 PROCEDURE — 99285 EMERGENCY DEPT VISIT HI MDM: CPT

## 2021-08-07 PROCEDURE — 71275 CT ANGIOGRAPHY CHEST: CPT

## 2021-08-07 PROCEDURE — 36415 COLL VENOUS BLD VENIPUNCTURE: CPT | Performed by: EMERGENCY MEDICINE

## 2021-08-07 PROCEDURE — 71046 X-RAY EXAM CHEST 2 VIEWS: CPT

## 2021-08-07 PROCEDURE — 96374 THER/PROPH/DIAG INJ IV PUSH: CPT

## 2021-08-07 PROCEDURE — 93005 ELECTROCARDIOGRAM TRACING: CPT

## 2021-08-07 PROCEDURE — 85379 FIBRIN DEGRADATION QUANT: CPT | Performed by: EMERGENCY MEDICINE

## 2021-08-07 PROCEDURE — 93010 ELECTROCARDIOGRAM REPORT: CPT | Performed by: INTERNAL MEDICINE

## 2021-08-07 RX ORDER — KETOROLAC TROMETHAMINE 30 MG/ML
15 INJECTION, SOLUTION INTRAMUSCULAR; INTRAVENOUS ONCE
Status: COMPLETED | OUTPATIENT
Start: 2021-08-07 | End: 2021-08-07

## 2021-08-07 RX ORDER — ACETAMINOPHEN 325 MG/1
975 TABLET ORAL ONCE
Status: COMPLETED | OUTPATIENT
Start: 2021-08-07 | End: 2021-08-07

## 2021-08-07 RX ADMIN — IOHEXOL 100 ML: 350 INJECTION, SOLUTION INTRAVENOUS at 11:57

## 2021-08-07 RX ADMIN — ACETAMINOPHEN 975 MG: 325 TABLET, FILM COATED ORAL at 11:41

## 2021-08-07 RX ADMIN — KETOROLAC TROMETHAMINE 15 MG: 30 INJECTION, SOLUTION INTRAMUSCULAR; INTRAVENOUS at 11:41

## 2021-08-07 NOTE — ED PROVIDER NOTES
[facility]  Chief Complaint   Patient presents with    Flank Pain     to L side onset 1 week ago, no burning with urination or injury      Patient is a 75-year-old female past medical history of GERD presenting with left flank pain  Patient states the last week she has had constant left-sided flank pain which is aching in nature, radiates into the left upper quadrant of her abdomen and states that nothing makes it better or worse and she has not taken any medication for  She believes she may have had before but that it self-resolved was never evaluated  She also notes sticking/needle-like left-sided chest pain intermittent in the same timeframe, not related to exertion but notes shortness of breath and cough with lying flat  Denies any fevers, nausea vomiting, diarrhea, dizziness, vision changes, rashes but does note throbbing posterior right knee pain intermittently in the same timeframe  Denies any injuries, falls, heavy lifting, new exercises  Denies any recent immobilization, cancer diagnosis, surgeries, prior blood clots or coagulopathies, use of estrogen products, hemoptysis  She denies any dysuria, urinary frequency, hematuria  Prior to Admission Medications   Prescriptions Last Dose Informant Patient Reported? Taking?   acetaminophen (TYLENOL) 325 mg tablet   Yes No   Sig: Take 650 mg by mouth every 6 (six) hours as needed for mild pain   Patient not taking: Reported on 7/13/2021   aspirin (ECOTRIN LOW STRENGTH) 81 mg EC tablet   Yes No   Sig: Take 81 mg by mouth daily   Patient not taking: Reported on 7/13/2021   cyclobenzaprine (FLEXERIL) 10 mg tablet   No No   Sig: Take 1 tablet (10 mg total) by mouth 2 (two) times a day as needed for muscle spasms   Patient not taking: Reported on 4/2/2021   naloxone (NARCAN) 4 mg/0 1 mL nasal spray   No No   Sig: Administer 1 spray into a nostril  If no response after 2-3 minutes, give another dose in the other nostril using a new spray     Patient not taking: Reported on 2021   naproxen (NAPROSYN) 500 mg tablet   No No   Sig: Take 1 tablet (500 mg total) by mouth 2 (two) times a day with meals   Patient not taking: Reported on 2021   oxyCODONE (ROXICODONE) 5 mg immediate release tablet   No No   Sig: Take 1 tablet (5 mg total) by mouth every 4 (four) hours as needed for moderate painMax Daily Amount: 30 mg   Patient not taking: Reported on 2021   oxyCODONE (ROXICODONE) 5 mg immediate release tablet   No No   Sig: Take 1 tablet (5 mg total) by mouth every 6 (six) hours as needed for moderate painMax Daily Amount: 20 mg   Patient not taking: Reported on 2021      Facility-Administered Medications: None       History reviewed  No pertinent past medical history  Past Surgical History:   Procedure Laterality Date     SECTION      MO OPEN TX TRIMALLEOLAR ANKLE FX W FIX PST LIP Left 2021    Procedure: OPEN REDUCTION W/ INTERNAL FIXATION (ORIF) LEFT ANKLE TRIMALLEOLAR FRACTURE;  Surgeon: Randy Arteaga MD;  [facility];  Service: Orthopedics       Family History   Problem Relation Age of Onset    Cancer Father     Diabetes Maternal Grandmother     Stroke Maternal Grandmother     Heart disease Maternal Grandmother      I have reviewed and agree with the history as documented  E-Cigarette/Vaping    E-Cigarette Use Never User      E-Cigarette/Vaping Substances    Nicotine No     THC No     CBD No     Flavoring No     Other No     Unknown No      Social History     Tobacco Use    Smoking status: Never Smoker    Smokeless tobacco: Never Used   Vaping Use    Vaping Use: Never used   Substance Use Topics    Alcohol use: Not Currently    Drug use: Never       Review of Systems   All other systems reviewed and are negative  Physical Exam  Physical Exam  Vitals reviewed  Constitutional:       General: She is not in acute distress  Appearance: Normal appearance  She is not ill-appearing     HENT: Mouth/Throat:      Mouth: Mucous membranes are moist    Eyes:      Conjunctiva/sclera: Conjunctivae normal    Cardiovascular:      Rate and Rhythm: Normal rate and regular rhythm  Pulses: Normal pulses  Heart sounds: Normal heart sounds  Pulmonary:      Effort: Pulmonary effort is normal       Breath sounds: Normal breath sounds  Abdominal:      General: Abdomen is flat  Palpations: Abdomen is soft  Tenderness: There is no abdominal tenderness  There is no right CVA tenderness, left CVA tenderness or guarding  Musculoskeletal:         General: No swelling or tenderness  Normal range of motion  Cervical back: Neck supple  Right lower leg: No edema  Left lower leg: No edema  Skin:     General: Skin is warm and dry  Neurological:      General: No focal deficit present  Mental Status: She is alert     Psychiatric:         Mood and Affect: Mood normal          Vital Signs  ED Triage Vitals [08/07/21 1110]   Temperature Pulse Respirations Blood Pressure SpO2   98 1 °F (36 7 °C) 78 18 124/62 99 %      Temp Source Heart Rate Source Patient Position - Orthostatic VS BP Location FiO2 (%)   Temporal -- Sitting Left arm --      Pain Score       --           Vitals:    08/07/21 1110   BP: 124/62   Pulse: 78   Patient Position - Orthostatic VS: Sitting         Visual Acuity      ED Medications  Medications   ketorolac (TORADOL) injection 15 mg (15 mg Intravenous Given 8/7/21 1141)   acetaminophen (TYLENOL) tablet 975 mg (975 mg Oral Given 8/7/21 1141)   iohexol (OMNIPAQUE) 350 MG/ML injection (SINGLE-DOSE) 100 mL (100 mL Intravenous Given 8/7/21 1157)       Diagnostic Studies  Results Reviewed     Procedure Component Value Units Date/Time    hCG, qualitative pregnancy [[id number]]  (Normal) Collected: 08/07/21 1130    Lab Status: Final result Specimen: Blood from Arm, Left Updated: 08/07/21 1200     Preg, Serum Negative    Troponin I [[id number]]  (Normal) Collected: 08/07/21 1130 Lab Status: Final result Specimen: Blood from Arm, Left Updated: 08/07/21 1155     Troponin I <0 02 ng/mL     Comprehensive metabolic panel [[id number]] Collected: 08/07/21 1130    Lab Status: Final result Specimen: Blood from Arm, Left Updated: 08/07/21 1153     Sodium 144 mmol/L      Potassium 4 1 mmol/L      Chloride 108 mmol/L      CO2 26 mmol/L      ANION GAP 10 mmol/L      BUN 15 mg/dL      Creatinine 0 86 mg/dL      Glucose 86 mg/dL      Calcium 8 6 mg/dL      AST 15 U/L      ALT 15 U/L      Alkaline Phosphatase 89 U/L      Total Protein 7 4 g/dL      Albumin 3 6 g/dL      Total Bilirubin 0 25 mg/dL      eGFR 101 ml/min/1 73sq m     Narrative:      National Kidney Disease Foundation guidelines for Chronic Kidney Disease (CKD):     Stage 1 with normal or high GFR (GFR > 90 mL/min/1 73 square meters)    Stage 2 Mild CKD (GFR = 60-89 mL/min/1 73 square meters)    Stage 3A Moderate CKD (GFR = 45-59 mL/min/1 73 square meters)    Stage 3B Moderate CKD (GFR = 30-44 mL/min/1 73 square meters)    Stage 4 Severe CKD (GFR = 15-29 mL/min/1 73 square meters)    Stage 5 End Stage CKD (GFR <15 mL/min/1 73 square meters)  Note: GFR calculation is accurate only with a steady state creatinine    D-Dimer [[id number]]  (Abnormal) Collected: 08/07/21 1130    Lab Status: Final result Specimen: Blood from Arm, Left Updated: 08/07/21 1153     D-Dimer, Quant 1 76 ug/ml FEU     CBC and differential [[id number]]  (Abnormal) Collected: 08/07/21 1130    Lab Status: Final result Specimen: Blood from Arm, Left Updated: 08/07/21 1135     WBC 5 22 Thousand/uL      RBC 4 75 Million/uL      Hemoglobin 12 6 g/dL      Hematocrit 39 1 %      MCV 82 fL      MCH 26 5 pg      MCHC 32 2 g/dL      RDW 13 6 %      MPV 10 7 fL      Platelets 099 Thousands/uL      nRBC 0 /100 WBCs      Neutrophils Relative 51 %      Immat GRANS % 0 %      Lymphocytes Relative 38 %      Monocytes Relative 8 %      Eosinophils Relative 2 %      Basophils Relative 1 % Neutrophils Absolute 2 71 Thousands/µL      Immature Grans Absolute 0 01 Thousand/uL      Lymphocytes Absolute 1 96 Thousands/µL      Monocytes Absolute 0 41 Thousand/µL      Eosinophils Absolute 0 10 Thousand/µL      Basophils Absolute 0 03 Thousands/µL                  CTA ED chest PE study   Final Result by Hunter Patricio MD (08/07 1216)      No pulmonary embolus  No acute pulmonary disease  Thymic hyperplasia  Workstation performed: [id number]         XR chest 2 views   Final Result by Hunter Patricio MD (08/07 1226)      No acute cardiopulmonary disease  Workstation performed: [id number]                    Procedures  ECG 12 Lead Documentation Only    Date/Time: 8/7/2021 12:00 PM  Performed by: Cherry Campos DO  Authorized by: Cherry Campos DO     ECG reviewed by me, the ED Provider: yes    Patient location:  ED  Previous ECG:     Previous ECG:  Compared to current    Similarity:  No change  Interpretation:     Interpretation: normal    Rate:     ECG rate assessment: normal    Rhythm:     Rhythm: sinus rhythm    Ectopy:     Ectopy: none    QRS:     QRS axis:  Normal    QRS intervals:  Normal  Conduction:     Conduction: normal    ST segments:     ST segments:  Normal  T waves:     T waves: normal               ED Course  ED Course as of Aug 07 1527   Sat Aug 07, 2021   1236 Workup unremarkable, have discussed return precautions of chest pain, trouble breathing, worsen or change pain, uncontrollable vomiting, fevers, lightheadedness or passing out patient states she understands and have advised PCP follow-up  SBIRT 20yo+      Most Recent Value   SBIRT (22 yo +)   In order to provide better care to our patients, we are screening all of our patients for alcohol and drug use  Would it be okay to ask you these screening questions? Yes Filed at: 08/07/2021 1122   Initial Alcohol Screen: US AUDIT-C    1   How often do you have a drink containing alcohol?  0 Filed at: 08/07/2021 1122   2  How many drinks containing alcohol do you have on a typical day you are drinking? 0 Filed at: 08/07/2021 1122   3a  Male UNDER 65: How often do you have five or more drinks on one occasion? 0 Filed at: 08/07/2021 1122   3b  FEMALE Any Age, or MALE 65+: How often do you have 4 or more drinks on one occassion? 0 Filed at: 08/07/2021 1122   Audit-C Score  0 Filed at: 08/07/2021 1122   MELODY: How many times in the past year have you    Used an illegal drug or used a prescription medication for non-medical reasons? Never Filed at: 08/07/2021 1122                    MDM  Number of Diagnoses or Management Options  Diagnosis management comments: Patient is a 25-year-old female past medical history of GERD presenting with left flank and chest pain  Patient is well-appearing bedside stable vitals and in no acute distress  She has no CVA or abdominal tenderness, lungs clear to auscultation, no lower extremity edema or tenderness and no significant physical exam findings  Will obtain cardiac workup including D-dimer in the setting of chest pain, shortness of breath, administer pain control, obtain urinalysis the patient does not have urinary symptoms will assess for possible pyelonephritis, and if unremarkable have discussed with patient will likely discharge with primary care follow-up  Disposition  Final diagnoses:   Flank pain     Time reflects when diagnosis was documented in both MDM as applicable and the Disposition within this note     Time User Action Codes Description Comment    8/7/2021 12:37 PM Avila Ho Add [R10 9] Flank pain       ED Disposition     ED Disposition Condition Date/Time Comment    Discharge Stable Sat Aug 7, 2021 12:37 PM Trista Downing discharge to home/self care              Follow-up Information     Follow up With Specialties Details Why [address], DO Internal Medicine Schedule an appointment as soon as possible for a visit in 1 week  [address]            Discharge Medication List as of 8/7/2021 12:37 PM      CONTINUE these medications which have NOT CHANGED    Details   acetaminophen (TYLENOL) 325 mg tablet Take 650 mg by mouth every 6 (six) hours as needed for mild pain, Historical Med      aspirin (ECOTRIN LOW STRENGTH) 81 mg EC tablet Take 81 mg by mouth daily, Historical Med      cyclobenzaprine (FLEXERIL) 10 mg tablet Take 1 tablet (10 mg total) by mouth 2 (two) times a day as needed for muscle spasms, Starting Tue 6/4/2019, Print      naloxone (NARCAN) 4 mg/0 1 mL nasal spray Administer 1 spray into a nostril  If no response after 2-3 minutes, give another dose in the other nostril using a new spray , Normal      naproxen (NAPROSYN) 500 mg tablet Take 1 tablet (500 mg total) by mouth 2 (two) times a day with meals, Starting Tue 6/4/2019, Print      !! oxyCODONE (ROXICODONE) 5 mg immediate release tablet Take 1 tablet (5 mg total) by mouth every 4 (four) hours as needed for moderate painMax Daily Amount: 30 mg, Starting Tue 1/19/2021, Normal      !! oxyCODONE (ROXICODONE) 5 mg immediate release tablet Take 1 tablet (5 mg total) by mouth every 6 (six) hours as needed for moderate painMax Daily Amount: 20 mg, Starting Mon 1/25/2021, Normal       !! - Potential duplicate medications found  Please discuss with provider  No discharge procedures on file      PDMP Review     None          ED Provider  Electronically Signed by           Jami Sotomayor DO  08/07/21 5179

## 2022-05-07 DIAGNOSIS — Z87.81 STATUS POST OPEN REDUCTION WITH INTERNAL FIXATION (ORIF) OF FRACTURE OF ANKLE: Primary | ICD-10-CM

## 2022-05-07 DIAGNOSIS — Z98.890 STATUS POST OPEN REDUCTION WITH INTERNAL FIXATION (ORIF) OF FRACTURE OF ANKLE: Primary | ICD-10-CM

## 2022-05-10 ENCOUNTER — APPOINTMENT (OUTPATIENT)
Dept: RADIOLOGY | Facility: CLINIC | Age: 37
End: 2022-05-10
Payer: COMMERCIAL

## 2022-05-10 ENCOUNTER — OFFICE VISIT (OUTPATIENT)
Dept: OBGYN CLINIC | Facility: CLINIC | Age: 37
End: 2022-05-10
Payer: COMMERCIAL

## 2022-05-10 VITALS
BODY MASS INDEX: 31.23 KG/M2 | HEIGHT: 67 IN | SYSTOLIC BLOOD PRESSURE: 120 MMHG | WEIGHT: 199 LBS | DIASTOLIC BLOOD PRESSURE: 82 MMHG | HEART RATE: 79 BPM

## 2022-05-10 DIAGNOSIS — Z98.890 STATUS POST OPEN REDUCTION WITH INTERNAL FIXATION (ORIF) OF FRACTURE OF ANKLE: ICD-10-CM

## 2022-05-10 DIAGNOSIS — Z98.890 STATUS POST OPEN REDUCTION WITH INTERNAL FIXATION (ORIF) OF FRACTURE OF ANKLE: Primary | ICD-10-CM

## 2022-05-10 DIAGNOSIS — Z87.81 STATUS POST OPEN REDUCTION WITH INTERNAL FIXATION (ORIF) OF FRACTURE OF ANKLE: ICD-10-CM

## 2022-05-10 DIAGNOSIS — Z87.81 STATUS POST OPEN REDUCTION WITH INTERNAL FIXATION (ORIF) OF FRACTURE OF ANKLE: Primary | ICD-10-CM

## 2022-05-10 PROCEDURE — 73610 X-RAY EXAM OF ANKLE: CPT

## 2022-05-10 PROCEDURE — 99213 OFFICE O/P EST LOW 20 MIN: CPT | Performed by: ORTHOPAEDIC SURGERY

## 2022-05-10 NOTE — PROGRESS NOTES
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 - patient has had reaction to jewelry in the past  Peroneal irritation over posterolateral plate - pain with forced inversion of ankle      Plan:   - Patient would benefit from removal of hardware left ankle, risks and benefits discussed today   - Weight bearing as tolerated   - Patient opts to continue conservative treatment at this time  - Follow up PRN       To Do Next Visit:  PRN     _____________________________________________________  CHIEF COMPLAINT:  Chief Complaint   Patient presents with    Left Ankle - Follow-up         SUBJECTIVE:  Radha Mancia is a 39 y o  female who presents 15 5 months s/p ORIF left ankle trimalleolar fracture on 1/25/21  She states she continues to have pain throughout her ankle which is intermittent in nature but states that it is generally severe in nature  Patient states she has not been icing or heating the ankle nor taking a type pain medication for the ankle  Patient is weight-bearing as tolerated on the left lower extremity  Patient offers no other complaints at this time      SOCIAL HISTORY:  Social History     Tobacco Use    Smoking status: Never Smoker    Smokeless tobacco: Never Used   Vaping Use    Vaping Use: Never used   Substance Use Topics    Alcohol use: Not Currently    Drug use: Never       MEDICATIONS:    Current Outpatient Medications:     acetaminophen (TYLENOL) 325 mg tablet, Take 650 mg by mouth every 6 (six) hours as needed for mild pain   (Patient not taking: Reported on 5/10/2022 ), Disp: , Rfl:     aspirin (ECOTRIN LOW STRENGTH) 81 mg EC tablet, Take 81 mg by mouth daily   (Patient not taking: Reported on 5/10/2022 ), Disp: , Rfl:     cyclobenzaprine (FLEXERIL) 10 mg tablet, Take 1 tablet (10 mg total) by mouth 2 (two) times a day as needed for muscle spasms (Patient not taking: Reported on 5/10/2022 ), Disp: 20 tablet, Rfl: 0    naloxone (NARCAN) 4 mg/0 1 mL nasal spray, Administer 1 spray into a nostril  If no response after 2-3 minutes, give another dose in the other nostril using a new spray  (Patient not taking: Reported on 5/10/2022 ), Disp: 1 each, Rfl: 1    naproxen (NAPROSYN) 500 mg tablet, Take 1 tablet (500 mg total) by mouth 2 (two) times a day with meals (Patient not taking: Reported on 5/10/2022 ), Disp: 20 tablet, Rfl: 0    oxyCODONE (ROXICODONE) 5 mg immediate release tablet, Take 1 tablet (5 mg total) by mouth every 4 (four) hours as needed for moderate painMax Daily Amount: 30 mg (Patient not taking: Reported on 5/10/2022 ), Disp: 30 tablet, Rfl: 0    oxyCODONE (ROXICODONE) 5 mg immediate release tablet, Take 1 tablet (5 mg total) by mouth every 6 (six) hours as needed for moderate painMax Daily Amount: 20 mg (Patient not taking: Reported on 5/10/2022 ), Disp: 30 tablet, Rfl: 0    REVIEW OF SYSTEMS:  MSK: left ankle pain   Neuro: WNL   Pertinent items are otherwise noted in HPI  A comprehensive review of systems was otherwise negative     _____________________________________________________  PHYSICAL EXAMINATION:  Vital signs: /82   Pulse 79   Ht 5' 7" (1 702 m)   Wt 90 3 kg (199 lb)   BMI 31 17 kg/m²   General: No acute distress, awake and alert  Psychiatric: Mood and affect appear appropriate  HEENT: Trachea Midline, No torticollis, no apparent facial trauma  Cardiovascular: No audible murmurs; Extremities appear perfused  Pulmonary: No audible wheezing or stridor  Skin: No open lesions; see further details (if any) below      MUSCULOSKELETAL EXAMINATION:  Left ankle examination:  - Patient sitting comfortably in the office in no acute distress   - healed incision sites noted over the medial and lateral aspect of the ankle  No acute visible abnormalities present in the left lower extremity    - minimal tenderness to palpation noted over the incision sites  No other bony or soft tissue tenderness palpation noted at this time   - NV intact    _____________________________________________________  STUDIES REVIEWED:  I personally reviewed the images and interpretation is as follows:  Left ankle XR 3 views:  Healed trimalleolar ankle fractures in acceptable anatomic alignment with hardware intact       PROCEDURES PERFORMED:  No procedures were performed at this time  Dano Wheeler PA-C - assisting    Zhou Betts MD                Portions of the record may have been created with voice recognition software  Occasional wrong word or "sound a like" substitutions may have occurred due to the inherent limitations of voice recognition software  Read the chart carefully and recognize, using context, where substitutions have occurred

## 2025-07-12 ENCOUNTER — APPOINTMENT (EMERGENCY)
Dept: CT IMAGING | Facility: HOSPITAL | Age: 40
End: 2025-07-12
Payer: COMMERCIAL

## 2025-07-12 ENCOUNTER — HOSPITAL ENCOUNTER (EMERGENCY)
Facility: HOSPITAL | Age: 40
Discharge: HOME | End: 2025-07-12
Attending: EMERGENCY MEDICINE | Admitting: EMERGENCY MEDICINE
Payer: COMMERCIAL

## 2025-07-12 ENCOUNTER — APPOINTMENT (EMERGENCY)
Dept: RADIOLOGY | Facility: HOSPITAL | Age: 40
End: 2025-07-12
Payer: COMMERCIAL

## 2025-07-12 VITALS
BODY MASS INDEX: 27.47 KG/M2 | SYSTOLIC BLOOD PRESSURE: 106 MMHG | RESPIRATION RATE: 22 BRPM | DIASTOLIC BLOOD PRESSURE: 59 MMHG | TEMPERATURE: 98.1 F | HEIGHT: 67 IN | HEART RATE: 73 BPM | WEIGHT: 175 LBS | OXYGEN SATURATION: 100 %

## 2025-07-12 DIAGNOSIS — R07.9 CHEST PAIN: Primary | ICD-10-CM

## 2025-07-12 LAB
ALBUMIN SERPL BCG-MCNC: 4.2 G/DL (ref 3.5–5)
ALP SERPL-CCNC: 47 U/L (ref 34–104)
ALT SERPL W P-5'-P-CCNC: 9 U/L (ref 7–52)
ANION GAP SERPL CALCULATED.3IONS-SCNC: 4 MMOL/L (ref 4–13)
ANISOCYTOSIS BLD QL SMEAR: PRESENT
AST SERPL W P-5'-P-CCNC: 24 U/L (ref 13–39)
BASOPHILS # BLD MANUAL: 0 THOUSAND/UL (ref 0–0.1)
BASOPHILS NFR MAR MANUAL: 0 % (ref 0–1)
BILIRUB SERPL-MCNC: 0.36 MG/DL (ref 0.2–1)
BUN SERPL-MCNC: 14 MG/DL (ref 5–25)
CALCIUM SERPL-MCNC: 9.1 MG/DL (ref 8.4–10.2)
CARDIAC TROPONIN I PNL SERPL HS: <2 NG/L (ref ?–50)
CARDIAC TROPONIN I PNL SERPL HS: <2 NG/L (ref ?–50)
CHLORIDE SERPL-SCNC: 112 MMOL/L (ref 96–108)
CO2 SERPL-SCNC: 26 MMOL/L (ref 21–32)
CREAT SERPL-MCNC: 0.76 MG/DL (ref 0.6–1.3)
D DIMER PPP FEU-MCNC: 2.47 UG/ML FEU
EOSINOPHIL # BLD MANUAL: 0.09 THOUSAND/UL (ref 0–0.4)
EOSINOPHIL NFR BLD MANUAL: 2 % (ref 0–6)
ERYTHROCYTE [DISTWIDTH] IN BLOOD BY AUTOMATED COUNT: 17 % (ref 11.6–15.1)
GFR SERPL CREATININE-BSD FRML MDRD: 99 ML/MIN/1.73SQ M
GLUCOSE SERPL-MCNC: 83 MG/DL (ref 65–140)
HCG SERPL QL: NEGATIVE
HCT VFR BLD AUTO: 30.8 % (ref 34.8–46.1)
HGB BLD-MCNC: 9.8 G/DL (ref 11.5–15.4)
HYPERCHROMIA BLD QL SMEAR: PRESENT
LIPASE SERPL-CCNC: 38 U/L (ref 11–82)
LYMPHOCYTES # BLD AUTO: 2.01 THOUSAND/UL (ref 0.6–4.47)
LYMPHOCYTES # BLD AUTO: 45 % (ref 14–44)
MCH RBC QN AUTO: 23.2 PG (ref 26.8–34.3)
MCHC RBC AUTO-ENTMCNC: 31.8 G/DL (ref 31.4–37.4)
MCV RBC AUTO: 73 FL (ref 82–98)
MICROCYTES BLD QL AUTO: PRESENT
MONOCYTES # BLD AUTO: 0.45 THOUSAND/UL (ref 0–1.22)
MONOCYTES NFR BLD: 10 % (ref 4–12)
NEUTROPHILS # BLD MANUAL: 1.92 THOUSAND/UL (ref 1.85–7.62)
NEUTS SEG NFR BLD AUTO: 43 % (ref 43–75)
PLATELET # BLD AUTO: 418 THOUSANDS/UL (ref 149–390)
PLATELET BLD QL SMEAR: ABNORMAL
PMV BLD AUTO: 10.8 FL (ref 8.9–12.7)
POTASSIUM SERPL-SCNC: 4.3 MMOL/L (ref 3.5–5.3)
PROT SERPL-MCNC: 6.9 G/DL (ref 6.4–8.4)
RBC # BLD AUTO: 4.23 MILLION/UL (ref 3.81–5.12)
RBC MORPH BLD: PRESENT
SODIUM SERPL-SCNC: 142 MMOL/L (ref 135–147)
WBC # BLD AUTO: 4.47 THOUSAND/UL (ref 4.31–10.16)

## 2025-07-12 PROCEDURE — 80053 COMPREHEN METABOLIC PANEL: CPT | Performed by: EMERGENCY MEDICINE

## 2025-07-12 PROCEDURE — 96374 THER/PROPH/DIAG INJ IV PUSH: CPT

## 2025-07-12 PROCEDURE — 71275 CT ANGIOGRAPHY CHEST: CPT

## 2025-07-12 PROCEDURE — 85007 BL SMEAR W/DIFF WBC COUNT: CPT | Performed by: EMERGENCY MEDICINE

## 2025-07-12 PROCEDURE — 93005 ELECTROCARDIOGRAM TRACING: CPT

## 2025-07-12 PROCEDURE — 96375 TX/PRO/DX INJ NEW DRUG ADDON: CPT

## 2025-07-12 PROCEDURE — 83690 ASSAY OF LIPASE: CPT | Performed by: EMERGENCY MEDICINE

## 2025-07-12 PROCEDURE — 99285 EMERGENCY DEPT VISIT HI MDM: CPT

## 2025-07-12 PROCEDURE — 36415 COLL VENOUS BLD VENIPUNCTURE: CPT | Performed by: EMERGENCY MEDICINE

## 2025-07-12 PROCEDURE — 84703 CHORIONIC GONADOTROPIN ASSAY: CPT | Performed by: EMERGENCY MEDICINE

## 2025-07-12 PROCEDURE — 84484 ASSAY OF TROPONIN QUANT: CPT | Performed by: EMERGENCY MEDICINE

## 2025-07-12 PROCEDURE — 85027 COMPLETE CBC AUTOMATED: CPT | Performed by: EMERGENCY MEDICINE

## 2025-07-12 PROCEDURE — 85379 FIBRIN DEGRADATION QUANT: CPT | Performed by: EMERGENCY MEDICINE

## 2025-07-12 PROCEDURE — 71046 X-RAY EXAM CHEST 2 VIEWS: CPT

## 2025-07-12 PROCEDURE — 99285 EMERGENCY DEPT VISIT HI MDM: CPT | Performed by: EMERGENCY MEDICINE

## 2025-07-12 RX ORDER — KETOROLAC TROMETHAMINE 30 MG/ML
15 INJECTION, SOLUTION INTRAMUSCULAR; INTRAVENOUS ONCE
Status: COMPLETED | OUTPATIENT
Start: 2025-07-12 | End: 2025-07-12

## 2025-07-12 RX ORDER — ACETAMINOPHEN 10 MG/ML
1000 INJECTION, SOLUTION INTRAVENOUS ONCE
Status: COMPLETED | OUTPATIENT
Start: 2025-07-12 | End: 2025-07-12

## 2025-07-12 RX ADMIN — KETOROLAC TROMETHAMINE 15 MG: 30 INJECTION, SOLUTION INTRAMUSCULAR at 18:17

## 2025-07-12 RX ADMIN — ACETAMINOPHEN 1000 MG: 10 INJECTION INTRAVENOUS at 18:02

## 2025-07-12 RX ADMIN — IOHEXOL 85 ML: 350 INJECTION, SOLUTION INTRAVENOUS at 19:27

## 2025-07-12 NOTE — ED PROVIDER NOTES
Time reflects when diagnosis was documented in both MDM as applicable and the Disposition within this note       Time User Action Codes Description Comment    7/12/2025  9:49 PM Jyoti Chua Add [R07.9] Chest pain           ED Disposition       ED Disposition   Discharge    Condition   Stable    Date/Time   Sat Jul 12, 2025  9:49 PM    Comment   Ellen Montero discharge to home/self care.                   Assessment & Plan       Medical Decision Making  Patient is a 39-year-old female no past medical history presenting with chest pain.  Patient is well-appearing at bedside with stable vitals and in no acute distress.  She has no tenderness or swelling to the lower extremities, equal radial pulses, lungs clear to auscultation with no signs of respiratory distress, no reproducible central chest tenderness but mild tenderness to the left upper quadrant epigastrium without guarding and no other significant physical exam findings.  Will obtain labs to assess for electrolyte MIs come anemia, GIOVANNY, transaminitis, pancreatitis, EKG and troponin to assess for ACS or arrhythmia, D-dimer to assess for pulmonary embolism, chest x-ray to assess for pneumonia, pulmonary edema, pneumothorax, give pain control and reassess.    Amount and/or Complexity of Data Reviewed  Labs: ordered.  Radiology: ordered and independent interpretation performed.    Risk  Prescription drug management.        ED Course as of 07/12/25 2150   Sat Jul 12, 2025 2148 Workup unremarkable, have discussed return precautions and outpatient follow-up and patient states he understands.       Medications   ketorolac (TORADOL) injection 15 mg (15 mg Intravenous Given 7/12/25 1817)   acetaminophen (Ofirmev) injection 1,000 mg (0 mg Intravenous Stopped 7/12/25 1817)   iohexol (OMNIPAQUE) 350 MG/ML injection (MULTI-DOSE) 85 mL (85 mL Intravenous Given 7/12/25 1927)       ED Risk Strat Scores   HEART Risk Score      Flowsheet Row Most Recent Value   Heart Score  Risk Calculator    History 0 Filed at: 07/12/2025 2150   ECG 1 Filed at: 07/12/2025 2150   Age 0 Filed at: 07/12/2025 2150   Risk Factors 0 Filed at: 07/12/2025 2150   Troponin 0 Filed at: 07/12/2025 2150   HEART Score 1 Filed at: 07/12/2025 2150          HEART Risk Score      Flowsheet Row Most Recent Value   Heart Score Risk Calculator    History 0 Filed at: 07/12/2025 2150   ECG 1 Filed at: 07/12/2025 2150   Age 0 Filed at: 07/12/2025 2150   Risk Factors 0 Filed at: 07/12/2025 2150   Troponin 0 Filed at: 07/12/2025 2150   HEART Score 1 Filed at: 07/12/2025 2150                      No data recorded        SBIRT 22yo+      Flowsheet Row Most Recent Value   Initial Alcohol Screen: US AUDIT-C     1. How often do you have a drink containing alcohol? 0 Filed at: 07/12/2025 1831   2. How many drinks containing alcohol do you have on a typical day you are drinking?  0 Filed at: 07/12/2025 1831   3b. FEMALE Any Age, or MALE 65+: How often do you have 4 or more drinks on one occassion? 0 Filed at: 07/12/2025 1831   Audit-C Score 0 Filed at: 07/12/2025 1831   MELODY: How many times in the past year have you...    Used an illegal drug or used a prescription medication for non-medical reasons? Never Filed at: 07/12/2025 1831                            History of Present Illness       Chief Complaint   Patient presents with    Chest Pain     Pt reports chest pressure that started earlier and left leg pain.        Past Medical History[1]   Past Surgical History[2]   Family History[3]   Social History[4]   E-Cigarette/Vaping    E-Cigarette Use Never User       E-Cigarette/Vaping Substances    Nicotine No     THC No     CBD No     Flavoring No     Other No     Unknown No       I have reviewed and agree with the history as documented.     Patient is a 39-year-old female no past medical history presenting for chest pain.  Patient notes central chest pressure radiating to the left shoulder and neck which has been intermittent and  "\"nabila\" in nature since 8 to 9 AM this morning.  States it is worse with lying down has not taken any medication for it.  Notes lightheadedness but denies any nausea or vomiting, shortness of breath, cough or fevers, falls or injuries, rashes, vision changes, dysuria, leg swelling.  Does note left calf and thigh pain and tingling which started on route.  Denies any history of blood clot, clotting disorders, cancer diagnosis, recent surgeries or immobilization or use of estrogen products.  Denies any family history of young or sudden cardiac death under 55.        Review of Systems   All other systems reviewed and are negative.          Objective       ED Triage Vitals   Temperature Pulse Blood Pressure Respirations SpO2 Patient Position - Orthostatic VS   07/12/25 1725 07/12/25 1725 07/12/25 1725 07/12/25 1725 07/12/25 1725 07/12/25 2030   98.1 °F (36.7 °C) 94 118/65 18 100 % Lying      Temp src Heart Rate Source BP Location FiO2 (%) Pain Score    -- 07/12/25 2030 07/12/25 2030 -- 07/12/25 1817     Monitor Right arm  6      Vitals      Date and Time Temp Pulse SpO2 Resp BP Pain Score FACES Pain Rating User   07/12/25 2130 -- 73 100 % 20 113/68 -- --    07/12/25 2100 -- 67 100 % 18 110/59 -- --    07/12/25 2030 -- 65 100 % 16 114/62 -- --    07/12/25 1832 -- 79 100 % 18 -- -- -- EN   07/12/25 1817 -- -- -- -- -- 6 -- AM   07/12/25 1725 98.1 °F (36.7 °C) 94 100 % 18 118/65 -- -- RS            Physical Exam  Vitals reviewed.   Constitutional:       General: She is not in acute distress.     Appearance: Normal appearance. She is not ill-appearing.   HENT:      Mouth/Throat:      Mouth: Mucous membranes are moist.     Eyes:      Conjunctiva/sclera: Conjunctivae normal.       Cardiovascular:      Rate and Rhythm: Normal rate and regular rhythm.      Pulses:           Radial pulses are 2+ on the right side and 2+ on the left side.      Heart sounds: Normal heart sounds.   Pulmonary:      Effort: Pulmonary " effort is normal.      Breath sounds: Normal breath sounds.   Chest:      Chest wall: No tenderness.   Abdominal:      General: Abdomen is flat.      Palpations: Abdomen is soft.      Tenderness: There is abdominal tenderness.      Comments: Mild left-sided/epigastric abdominal tenderness     Musculoskeletal:         General: No swelling. Normal range of motion.      Cervical back: Neck supple.      Right lower leg: No tenderness. No edema.      Left lower leg: No tenderness. No edema.     Skin:     General: Skin is warm and dry.     Neurological:      General: No focal deficit present.      Mental Status: She is alert.      Motor: No weakness.      Comments: No sensory deficits in the lower extremities   Psychiatric:         Mood and Affect: Mood normal.         Results Reviewed       Procedure Component Value Units Date/Time    HS Troponin I 2hr [[id number]] Collected: 07/12/25 2012    Lab Status: Final result Specimen: Blood from Arm, Right Updated: 07/12/25 2045     hs TnI 2hr <2 ng/L      Delta 2hr hsTnI --    HS Troponin I 4hr [[id number]]     Lab Status: No result Specimen: Blood     RBC Morphology Reflex Test [[id number]] Collected: 07/12/25 1803    Lab Status: Final result Specimen: Blood from Arm, Left Updated: 07/12/25 1903    D-Dimer [[id number]]  (Abnormal) Collected: 07/12/25 1832    Lab Status: Final result Specimen: Blood from Arm, Left Updated: 07/12/25 1852     D-Dimer, Quant 2.47 ug/ml FEU     Manual Differential(PHLEBS Do Not Order) [[id number]]  (Abnormal) Collected: 07/12/25 1803    Lab Status: Final result Specimen: Blood from Arm, Left Updated: 07/12/25 1840     Segmented % 43 %      Lymphocytes % 45 %      Monocytes % 10 %      Eosinophils % 2 %      Basophils % 0 %      Absolute Neutrophils 1.92 Thousand/uL      Absolute Lymphocytes 2.01 Thousand/uL      Absolute Monocytes 0.45 Thousand/uL      Absolute Eosinophils 0.09 Thousand/uL      Absolute Basophils 0.00 Thousand/uL      Total Counted --      RBC Morphology Present     Platelet Estimate Increased     Anisocytosis Present     Hypochromia Present     Microcytes Present    CBC and differential [[id number]]  (Abnormal) Collected: 07/12/25 1803    Lab Status: Final result Specimen: Blood from Arm, Left Updated: 07/12/25 1840     WBC 4.47 Thousand/uL      RBC 4.23 Million/uL      Hemoglobin 9.8 g/dL      Hematocrit 30.8 %      MCV 73 fL      MCH 23.2 pg      MCHC 31.8 g/dL      RDW 17.0 %      MPV 10.8 fL      Platelets 418 Thousands/uL     Narrative:      This is an appended report.  These results have been appended to a previously verified report.    hCG, qualitative pregnancy [[id number]]  (Normal) Collected: 07/12/25 1803    Lab Status: Final result Specimen: Blood from Arm, Left Updated: 07/12/25 1836     Preg, Serum Negative    HS Troponin 0hr (reflex protocol) [[id number]]  (Normal) Collected: 07/12/25 1803    Lab Status: Final result Specimen: Blood from Arm, Left Updated: 07/12/25 1835     hs TnI 0hr <2 ng/L     Comprehensive metabolic panel [[id number]]  (Abnormal) Collected: 07/12/25 1803    Lab Status: Final result Specimen: Blood from Arm, Left Updated: 07/12/25 1828     Sodium 142 mmol/L      Potassium 4.3 mmol/L      Chloride 112 mmol/L      CO2 26 mmol/L      ANION GAP 4 mmol/L      BUN 14 mg/dL      Creatinine 0.76 mg/dL      Glucose 83 mg/dL      Calcium 9.1 mg/dL      AST 24 U/L      ALT 9 U/L      Alkaline Phosphatase 47 U/L      Total Protein 6.9 g/dL      Albumin 4.2 g/dL      Total Bilirubin 0.36 mg/dL      eGFR 99 ml/min/1.73sq m     Narrative:      National Kid[employer] guidelines for Chronic Kidney Disease (CKD):     Stage 1 with normal or high GFR (GFR > 90 mL/min/1.73 square meters)    Stage 2 Mild CKD (GFR = 60-89 mL/min/1.73 square meters)    Stage 3A Moderate CKD (GFR = 45-59 mL/min/1.73 square meters)    Stage 3B Moderate CKD (GFR = 30-44 mL/min/1.73 square meters)    Stage 4 Severe CKD (GFR = 15-29 mL/min/1.73  square meters)    Stage 5 End Stage CKD (GFR <15 mL/min/1.73 square meters)  Note: GFR calculation is accurate only with a steady state creatinine    Lipase [[id number]]  (Normal) Collected: 07/12/25 1803    Lab Status: Final result Specimen: Blood from Arm, Left Updated: 07/12/25 1827     Lipase 38 u/L             CTA chest pe study   Final Interpretation by Albaro Schuster DO (07/12 2143)      No central or segmental pulmonary embolus.                  Computerized Assisted Algorithm (CAA) may have aided analysis of applicable images.      Workstation performed: [id number]         XR chest 2 views   ED Interpretation by Jyoti Chua DO (07/12 1758)   NAD          ECG 12 Lead Documentation Only    Date/Time: 7/12/2025 5:35 PM    Performed by: Jyoti Chua DO  Authorized by: Jyoti Chua DO    Patient location:  ED  Previous ECG:     Previous ECG:  Compared to current    Similarity:  No change  Interpretation:     Interpretation: non-specific    Rate:     ECG rate assessment: normal    Rhythm:     Rhythm: sinus rhythm    Ectopy:     Ectopy: none    QRS:     QRS axis:  Normal    QRS intervals:  Normal  Conduction:     Conduction: normal    ST segments:     ST segments:  Normal  T waves:     T waves: non-specific        ED Medication and Procedure Management   Prior to Admission Medications   Prescriptions Last Dose Informant Patient Reported? Taking?   acetaminophen (TYLENOL) 325 mg tablet   Yes No   Sig: Take 650 mg by mouth every 6 (six) hours as needed for mild pain     Patient not taking: Reported on 5/10/2022    aspirin (ECOTRIN LOW STRENGTH) 81 mg EC tablet   Yes No   Sig: Take 81 mg by mouth daily     Patient not taking: Reported on 5/10/2022    cyclobenzaprine (FLEXERIL) 10 mg tablet   No No   Sig: Take 1 tablet (10 mg total) by mouth 2 (two) times a day as needed for muscle spasms   Patient not taking: Reported on 5/10/2022    naloxone (NARCAN) 4 mg/0.1 mL nasal spray   No No   Sig:  Administer 1 spray into a nostril. If no response after 2-3 minutes, give another dose in the other nostril using a new spray.   Patient not taking: Reported on 5/10/2022    naproxen (NAPROSYN) 500 mg tablet   No No   Sig: Take 1 tablet (500 mg total) by mouth 2 (two) times a day with meals   Patient not taking: Reported on 5/10/2022    oxyCODONE (ROXICODONE) 5 mg immediate release tablet   No No   Sig: Take 1 tablet (5 mg total) by mouth every 4 (four) hours as needed for moderate painMax Daily Amount: 30 mg   Patient not taking: Reported on 5/10/2022    oxyCODONE (ROXICODONE) 5 mg immediate release tablet   No No   Sig: Take 1 tablet (5 mg total) by mouth every 6 (six) hours as needed for moderate painMax Daily Amount: 20 mg   Patient not taking: Reported on 5/10/2022       Facility-Administered Medications: None     Patient's Medications   Discharge Prescriptions    No medications on file     No discharge procedures on file.  ED SEPSIS DOCUMENTATION   Time reflects when diagnosis was documented in both MDM as applicable and the Disposition within this note       Time User Action Codes Description Comment    2025  9:49 PM Jyoti Chua Add [R07.9] Chest pain                    [1] No past medical history on file.  [2]   Past Surgical History:  Procedure Laterality Date     SECTION      ND OPEN TX TRIMALLEOLAR ANKLE FX W/FIXJ PST LIP Left 2021    Procedure: OPEN REDUCTION W/ INTERNAL FIXATION (ORIF) LEFT ANKLE TRIMALLEOLAR FRACTURE;  Surgeon: Eduardo Springer MD;  Location: [facility];  Service: Orthopedics   [3]   Family History  Problem Relation Name Age of Onset    Cancer Father      Diabetes Maternal Grandmother      Stroke Maternal Grandmother      Heart disease Maternal Grandmother     [4]   Social History  Tobacco Use    Smoking status: Never    Smokeless tobacco: Never   Vaping Use    Vaping status: Never Used   Substance Use Topics    Alcohol use: Not Currently    Drug use: Never         Jyoti Chua,   07/12/25 0660

## 2025-07-13 LAB
ATRIAL RATE: 89 BPM
P AXIS: 77 DEGREES
PR INTERVAL: 172 MS
QRS AXIS: 65 DEGREES
QRSD INTERVAL: 68 MS
QT INTERVAL: 352 MS
QTC INTERVAL: 428 MS
T WAVE AXIS: 53 DEGREES
VENTRICULAR RATE: 89 BPM

## 2025-07-13 PROCEDURE — 93010 ELECTROCARDIOGRAM REPORT: CPT | Performed by: INTERNAL MEDICINE

## (undated) DEVICE — DRAPE C-ARMOUR

## (undated) DEVICE — DRAPE SHEET THREE QUARTER

## (undated) DEVICE — KERLIX BANDAGE ROLL: Brand: KERLIX

## (undated) DEVICE — 2.7MM THREE-FLUTED DRILL BIT QC/125MM

## (undated) DEVICE — SPONGE SCRUB 4 PCT CHLORHEXIDINE

## (undated) DEVICE — 4.0MM CANCELLOUS BONE SCREW FULLY THREADED/14MM
Type: IMPLANTABLE DEVICE | Site: ANKLE | Status: NON-FUNCTIONAL
Removed: 2021-01-25

## (undated) DEVICE — 2.0MM DRILL BIT WITH DEPTH MARK/QC/140MM

## (undated) DEVICE — CURITY NON-ADHERENT STRIPS: Brand: CURITY

## (undated) DEVICE — 3M™ STERI-STRIP™ REINFORCED ADHESIVE SKIN CLOSURES, R1541, 1/4 IN X 3 IN (6 MM X 75 MM), 3 STRIPS/ENVELOPE: Brand: 3M™ STERI-STRIP™

## (undated) DEVICE — 2.5MM DRILL BIT/QC/GOLD/110MM

## (undated) DEVICE — SPONGE LAP 18 X 18 IN STRL RFD

## (undated) DEVICE — PADDING CAST 4 IN  COTTON STRL

## (undated) DEVICE — 2.7MM DRILL BIT/QC/100MM

## (undated) DEVICE — 1.25MM NON-THREADED GUIDE WIRE 150MM

## (undated) DEVICE — 2.0MM DRILL BIT/QC/125MM

## (undated) DEVICE — COBAN 4 IN STERILE

## (undated) DEVICE — SUT ETHILON 3-0 PS-1 18 IN 1663H

## (undated) DEVICE — PROXIMATE PLUS MD MULTI-DIRECTIONAL RELEASE SKIN STAPLERS CONTAINS 35 STAINLESS STEEL STAPLES APPROXIMATE CLOSED DIMENSIONS: 6.9MM X 3.9MM WIDE: Brand: PROXIMATE

## (undated) DEVICE — 3M™ IOBAN™ 2 ANTIMICROBIAL INCISE DRAPE 6640EZ: Brand: IOBAN™ 2

## (undated) DEVICE — 2.7MM CANNULATED DRILL BIT/QC 160MM

## (undated) DEVICE — PENCIL ELECTROSURG E-Z CLEAN -0035H

## (undated) DEVICE — 2.8MM PERCUTANEOUS DRILL BIT F/LCP® PL QC/200MM/100MM CALIB: Brand: LCP

## (undated) DEVICE — GLOVE SRG BIOGEL ORTHOPEDIC 8

## (undated) DEVICE — GLOVE INDICATOR PI UNDERGLOVE SZ 8 BLUE

## (undated) DEVICE — 1.6MM KIRSCHNER WIRE W/TROCAR POINT 150MM
Type: IMPLANTABLE DEVICE | Site: ANKLE | Status: NON-FUNCTIONAL
Removed: 2021-01-25

## (undated) DEVICE — 3.5MM DRILL BIT/QC/110MM

## (undated) DEVICE — 1.8MM DRILL BIT WITH DEPTH MARK/QC/110MM

## (undated) DEVICE — CUFF TOURNIQUET 30 X 4 IN QUICK CONNECT DISP 1BLA

## (undated) DEVICE — SPLINT 5 X 30 IN FAST SET PLASTER

## (undated) DEVICE — MAYO STAND COVER: Brand: CONVERTORS

## (undated) DEVICE — LIGHT HANDLE COVER SLEEVE DISP BLUE STELLAR

## (undated) DEVICE — INTENDED FOR TISSUE SEPARATION, AND OTHER PROCEDURES THAT REQUIRE A SHARP SURGICAL BLADE TO PUNCTURE OR CUT.: Brand: BARD-PARKER SAFETY BLADES SIZE 15, STERILE

## (undated) DEVICE — ACE WRAP 4 IN UNSTERILE

## (undated) DEVICE — PAD GROUNDING ADULT

## (undated) DEVICE — SCD SEQUENTIAL COMPRESSION COMFORT SLEEVE MEDIUM KNEE LENGTH: Brand: KENDALL SCD

## (undated) DEVICE — ACE WRAP 6 IN UNSTERILE

## (undated) DEVICE — ELECTRODE BLADE MOD E-Z CLEAN  2.75IN 7CM -0012AM

## (undated) DEVICE — 3M™ STERI-DRAPE™ U-DRAPE 1015: Brand: STERI-DRAPE™

## (undated) DEVICE — 3M™ STERI-STRIP™ REINFORCED ADHESIVE SKIN CLOSURES, R1546, 1/4 IN X 4 IN (6 MM X 100 MM), 10 STRIPS/ENVELOPE: Brand: 3M™ STERI-STRIP™

## (undated) DEVICE — DRAPE EQUIPMENT RF WAND

## (undated) DEVICE — SUT VICRYL 2-0 CT-1 27 IN J259H

## (undated) DEVICE — BANDAGE, ESMARK LF STR 6"X9' (20/CS): Brand: CYPRESS

## (undated) DEVICE — BETHLEHEM UNIVERSAL  MIONR EXT: Brand: CARDINAL HEALTH

## (undated) DEVICE — 3M™ IOBAN™ 2 ANTIMICROBIAL INCISE DRAPE 6650EZ: Brand: IOBAN™ 2

## (undated) DEVICE — TUBING SUCTION 5MM X 12 FT

## (undated) DEVICE — CHLORAPREP HI-LITE 26ML ORANGE

## (undated) DEVICE — GAUZE SPONGES,16 PLY: Brand: CURITY

## (undated) DEVICE — ABDOMINAL PAD: Brand: DERMACEA

## (undated) DEVICE — TOWEL SURG XR DETECT GREEN STRL RFD

## (undated) DEVICE — DRAPE C-ARM X-RAY